# Patient Record
Sex: MALE | Race: WHITE | NOT HISPANIC OR LATINO | Employment: UNEMPLOYED | URBAN - METROPOLITAN AREA
[De-identification: names, ages, dates, MRNs, and addresses within clinical notes are randomized per-mention and may not be internally consistent; named-entity substitution may affect disease eponyms.]

---

## 2017-08-10 RX ORDER — FLUTICASONE PROPIONATE 50 MCG
2 SPRAY, SUSPENSION (ML) NASAL
COMMUNITY
End: 2021-07-22 | Stop reason: SDUPTHER

## 2017-08-10 RX ORDER — OXYCODONE AND ACETAMINOPHEN 10; 325 MG/1; MG/1
1 TABLET ORAL 4 TIMES DAILY
COMMUNITY

## 2017-08-17 ENCOUNTER — OFFICE VISIT (OUTPATIENT)
Dept: NEUROSURGERY | Facility: CLINIC | Age: 57
End: 2017-08-17

## 2017-08-17 VITALS
HEART RATE: 70 BPM | WEIGHT: 240 LBS | DIASTOLIC BLOOD PRESSURE: 81 MMHG | SYSTOLIC BLOOD PRESSURE: 130 MMHG | BODY MASS INDEX: 33.6 KG/M2 | HEIGHT: 71 IN

## 2017-08-17 DIAGNOSIS — M51.17 INTERVERTEBRAL DISC DISORDER WITH RADICULOPATHY OF LUMBOSACRAL REGION: ICD-10-CM

## 2017-08-17 DIAGNOSIS — M54.14 THORACIC RADICULOPATHY: ICD-10-CM

## 2017-08-17 DIAGNOSIS — M54.6 PAIN IN THORACIC SPINE: Primary | ICD-10-CM

## 2017-08-17 DIAGNOSIS — R26.2 DIFFICULTY WALKING: ICD-10-CM

## 2017-08-17 DIAGNOSIS — Z98.1 HX OF FUSION OF CERVICAL SPINE: ICD-10-CM

## 2017-08-17 DIAGNOSIS — M54.2 CERVICAL PAIN: ICD-10-CM

## 2017-08-17 PROCEDURE — 99205 OFFICE O/P NEW HI 60 MIN: CPT | Performed by: NURSE PRACTITIONER

## 2017-08-17 NOTE — PROGRESS NOTES
Subjective   Patient ID: Anthony Mcknight is a 56 y.o. male is being seen for consultation today at the request of Eddie Carpenter MD for back pain that radiates into bilateral buttock. He states that he has a tired feeling in his legs. There is constant numbness in third toe on right foot and intermittent numbness in the heel of the left foot. He is currently taking Percocet  mg PRN for pain.    Back Pain   This is a chronic problem. The current episode started more than 1 year ago. The problem occurs constantly. The problem has been gradually worsening since onset. The pain is present in the lumbar spine, gluteal and thoracic spine. The quality of the pain is described as stabbing, shooting, cramping, burning and aching. The pain radiates to the right thigh and left thigh. The pain is at a severity of 7/10. The pain is moderate. Worse during: depends on daily activity. The symptoms are aggravated by bending, standing, sitting, lying down and twisting (walking). Associated symptoms include abdominal pain (Radiating from the lower thoracic region.), leg pain, numbness, tingling and weakness (bilateral legs). Pertinent negatives include no bladder incontinence or bowel incontinence. (Worsening heaviness in both legs. ) Treatments tried: GORAN, oral narcotics. The treatment provided mild relief.   Neck Pain    This is a chronic problem. The current episode started more than 1 year ago. The problem occurs constantly. The problem has been gradually worsening. Associated with: Has had previous anterior and posterior cervical spine fusion surgeries.  The pain is present in the midline, right side, left side and occipital region. The quality of the pain is described as aching. The pain is at a severity of 7/10. The pain is moderate. The symptoms are aggravated by position, twisting and bending. The pain is same all the time. Associated symptoms include leg pain, numbness, tingling and weakness (bilateral legs). He has  tried oral narcotics and muscle relaxants for the symptoms. The treatment provided no relief.       The following portions of the patient's history were reviewed and updated as appropriate: allergies, current medications, past family history, past medical history, past social history, past surgical history and problem list.    Review of Systems   Constitutional: Positive for activity change.   HENT: Positive for congestion.    Gastrointestinal: Positive for abdominal pain (Radiating from the lower thoracic region.). Negative for bowel incontinence.   Genitourinary: Negative for bladder incontinence.   Musculoskeletal: Positive for arthralgias, back pain, gait problem, joint swelling, myalgias, neck pain and neck stiffness.   Allergic/Immunologic: Positive for environmental allergies.   Neurological: Positive for tingling, weakness (bilateral legs) and numbness.   All other systems reviewed and are negative.      Objective   Physical Exam   Constitutional: He is oriented to person, place, and time. He appears well-developed and well-nourished. He is cooperative. No distress.   HENT:   Head: Normocephalic and atraumatic.   Eyes: Conjunctivae and EOM are normal. Pupils are equal, round, and reactive to light.   Neck: Neck supple.   Cardiovascular: Normal rate and regular rhythm.    Pulmonary/Chest: Effort normal. No respiratory distress.   Abdominal: Soft. He exhibits no distension. There is no tenderness.   Musculoskeletal: Normal range of motion. He exhibits tenderness (in the lower cervical, lower thoracic, and lower lumbar regions with palpation.). He exhibits no edema.   Neurological: He is alert and oriented to person, place, and time. He has normal strength. He displays normal reflexes. No cranial nerve deficit or sensory deficit. He exhibits normal muscle tone. Gait normal. Coordination (Unable to heel walk bilaterally. ) abnormal. Gait normal. GCS eye subscore is 4. GCS verbal subscore is 5. GCS motor subscore  is 6.   Reflex Scores:       Tricep reflexes are 2+ on the right side and 2+ on the left side.       Bicep reflexes are 2+ on the right side and 2+ on the left side.       Brachioradialis reflexes are 2+ on the right side and 2+ on the left side.       Patellar reflexes are 2+ on the right side and 2+ on the left side.       Achilles reflexes are 2+ on the right side and 2+ on the left side.  Positive SLR on the right at 30 degrees. Negative Dong's; negative clonus   Skin: Skin is warm and dry. No rash noted. He is not diaphoretic.   Psychiatric: He has a normal mood and affect. His speech is normal. Thought content normal.   Vitals reviewed.    Neurologic Exam     Mental Status   Oriented to person, place, and time.   Speech: speech is normal   Level of consciousness: alert    Cranial Nerves     CN III, IV, VI   Pupils are equal, round, and reactive to light.  Extraocular motions are normal.     Motor Exam   Muscle bulk: normal  Overall muscle tone: normal    Strength   Strength 5/5 throughout.     Sensory Exam   Light touch normal.     Gait, Coordination, and Reflexes     Gait  Gait: normal    Reflexes   Right brachioradialis: 2+  Left brachioradialis: 2+  Right biceps: 2+  Left biceps: 2+  Right triceps: 2+  Left triceps: 2+  Right patellar: 2+  Left patellar: 2+  Right achilles: 2+  Left achilles: 2+  Right Dong: absent  Left Dong: absent  Right ankle clonus: absent  Left ankle clonus: absent      Assessment/Plan   Independent Review of Radiographic Studies:      I reviewed the MRI images of the lumbar spine performed with and without contrast dated March 18, 2017 today in the office.  The MRI showed multilevel degenerative changes and facet arthropathy.  There is a broad-based disc protrusion at L4-5, L5-S1 and some effacement of bilateral L5 nerve roots.    Medical Decision Making:      Mr. Mcknight was seen today for neurosurgical opinion at the request of Dr. Eddie Carpenter.  Notes from today's visit  will be forwarded upon completion.  Mr. Mcknight has a long history of neck, thoracic, and lumbar pain.  He has a history of prior anterior cervical fusion followed by subsequent posterior cervical fusion in 2008 and 2009.  He is fused posteriorly to the level of T1.  These surgeries were performed while living in [place].  He has been working with Dr. Carpenter for pain management.  He is undergone epidural steroid injections in the lumbar spine with no relief.    Mr. Mcknight complains of worsening neck, bilateral trapezius, worsening thoracic pain with radiation to the lower abdomen and worsening lumbar pain with radiation into both lateral hips and thighs.  He also complains of worsening heaviness in both legs.    Given the history and exam findings, I have ordered a complete spine myelogram as well as complete spine x-rays with flexion-extension views of the cervical and lumbar spine.    .I have discussed the myelogram procedure at length with the patient. The risks of the procedure were explained. There is a risk of infection, bleeding, increased pain and positional headache possibly requiring a blood patch. The best way to avoid the positional headache is by taking it easy and participating in no strenuous activity for a couple of days following the myelogram. Drinking plenty of fluids including caffeinated beverages was encouraged. Should the patient develop a positional headache, I recommended calling the office for further instructions. The patient verbalized understanding of these risks and asked to proceed.     Mr. Mcknight will return to the office after the myelogram for a surgical discussion with Dr. Horne.     Anthony was seen today for back pain.    Diagnoses and all orders for this visit:    Pain in thoracic spine  -     IR myelogram 2 or more areas; Future  -     CT Cervical Spine Without Contrast; Future  -     CT Thoracic Spine Without Contrast; Future  -     XR Spine Lumbar Complete With Flex &  Ext; Future  -     XR Spine Thoracic 2 View; Future  -     Cancel: CT Cervical Spine Without Contrast; Future  -     XR spine cervical complete w flex ext; Future  -     CT Lumbar Spine Without Contrast; Future    Difficulty walking  -     IR myelogram 2 or more areas; Future  -     CT Cervical Spine Without Contrast; Future  -     CT Thoracic Spine Without Contrast; Future  -     XR Spine Lumbar Complete With Flex & Ext; Future  -     XR Spine Thoracic 2 View; Future  -     Cancel: CT Cervical Spine Without Contrast; Future  -     XR spine cervical complete w flex ext; Future  -     CT Lumbar Spine Without Contrast; Future    Intervertebral disc disorder with radiculopathy of lumbosacral region  -     IR myelogram 2 or more areas; Future  -     CT Cervical Spine Without Contrast; Future  -     CT Thoracic Spine Without Contrast; Future  -     XR Spine Lumbar Complete With Flex & Ext; Future  -     XR Spine Thoracic 2 View; Future  -     Cancel: CT Cervical Spine Without Contrast; Future  -     XR spine cervical complete w flex ext; Future  -     CT Lumbar Spine Without Contrast; Future    Thoracic radiculopathy  -     IR myelogram 2 or more areas; Future  -     CT Cervical Spine Without Contrast; Future  -     CT Thoracic Spine Without Contrast; Future  -     XR Spine Lumbar Complete With Flex & Ext; Future  -     XR Spine Thoracic 2 View; Future  -     Cancel: CT Cervical Spine Without Contrast; Future  -     XR spine cervical complete w flex ext; Future  -     CT Lumbar Spine Without Contrast; Future    Cervical pain  -     Cancel: CT Cervical Spine Without Contrast; Future  -     XR spine cervical complete w flex ext; Future  -     CT Lumbar Spine Without Contrast; Future    Hx of fusion of cervical spine  -     Cancel: CT Cervical Spine Without Contrast; Future  -     XR spine cervical complete w flex ext; Future  -     CT Lumbar Spine Without Contrast; Future      Return for after radiographic imaging.            Body mass index is 33.47 kg/(m^2).

## 2017-09-20 RX ORDER — ASPIRIN 81 MG/1
81 TABLET ORAL NIGHTLY
COMMUNITY

## 2017-09-27 ENCOUNTER — HOSPITAL ENCOUNTER (OUTPATIENT)
Dept: GENERAL RADIOLOGY | Facility: HOSPITAL | Age: 57
Discharge: HOME | End: 2017-09-27

## 2017-09-27 ENCOUNTER — HOSPITAL ENCOUNTER (OUTPATIENT)
Dept: CT IMAGING | Facility: HOSPITAL | Age: 57
Discharge: HOME | End: 2017-09-27
Admitting: NURSE PRACTITIONER

## 2017-09-27 VITALS
TEMPERATURE: 97.7 F | WEIGHT: 240 LBS | SYSTOLIC BLOOD PRESSURE: 140 MMHG | DIASTOLIC BLOOD PRESSURE: 81 MMHG | HEIGHT: 71 IN | HEART RATE: 77 BPM | BODY MASS INDEX: 33.6 KG/M2 | RESPIRATION RATE: 18 BRPM | OXYGEN SATURATION: 98 %

## 2017-09-27 DIAGNOSIS — M54.6 PAIN IN THORACIC SPINE: ICD-10-CM

## 2017-09-27 DIAGNOSIS — M54.14 THORACIC RADICULOPATHY: ICD-10-CM

## 2017-09-27 DIAGNOSIS — Z98.1 HX OF FUSION OF CERVICAL SPINE: ICD-10-CM

## 2017-09-27 DIAGNOSIS — R26.2 DIFFICULTY WALKING: ICD-10-CM

## 2017-09-27 DIAGNOSIS — M51.17 INTERVERTEBRAL DISC DISORDER WITH RADICULOPATHY OF LUMBOSACRAL REGION: ICD-10-CM

## 2017-09-27 DIAGNOSIS — M54.2 CERVICAL PAIN: ICD-10-CM

## 2017-09-27 PROCEDURE — 72114 X-RAY EXAM L-S SPINE BENDING: CPT

## 2017-09-27 PROCEDURE — 0 IOPAMIDOL 61 % SOLUTION: Performed by: RADIOLOGY

## 2017-09-27 PROCEDURE — 72131 CT LUMBAR SPINE W/O DYE: CPT

## 2017-09-27 PROCEDURE — 72240 MYELOGRAPHY NECK SPINE: CPT

## 2017-09-27 PROCEDURE — 72052 X-RAY EXAM NECK SPINE 6/>VWS: CPT

## 2017-09-27 PROCEDURE — 72125 CT NECK SPINE W/O DYE: CPT

## 2017-09-27 PROCEDURE — 72128 CT CHEST SPINE W/O DYE: CPT

## 2017-09-27 PROCEDURE — 72072 X-RAY EXAM THORAC SPINE 3VWS: CPT

## 2017-09-27 RX ORDER — HYDROXYCHLOROQUINE SULFATE 200 MG/1
200 TABLET, FILM COATED ORAL 2 TIMES DAILY
COMMUNITY

## 2017-09-27 RX ORDER — LIDOCAINE HYDROCHLORIDE 10 MG/ML
10 INJECTION, SOLUTION INFILTRATION; PERINEURAL ONCE
Status: COMPLETED | OUTPATIENT
Start: 2017-09-27 | End: 2017-09-27

## 2017-09-27 RX ORDER — ALPRAZOLAM 0.5 MG/1
0.5 TABLET ORAL ONCE
Status: COMPLETED | OUTPATIENT
Start: 2017-09-27 | End: 2017-09-27

## 2017-09-27 RX ORDER — HYDROCODONE BITARTRATE AND ACETAMINOPHEN 7.5; 325 MG/1; MG/1
1 TABLET ORAL ONCE
Status: COMPLETED | OUTPATIENT
Start: 2017-09-27 | End: 2017-09-27

## 2017-09-27 RX ADMIN — HYDROCODONE BITARTRATE AND ACETAMINOPHEN 1 TABLET: 7.5; 325 TABLET ORAL at 10:36

## 2017-09-27 RX ADMIN — ALPRAZOLAM 0.5 MG: 0.5 TABLET ORAL at 08:30

## 2017-09-27 RX ADMIN — IOPAMIDOL 13 ML: 612 INJECTION, SOLUTION INTRATHECAL at 10:02

## 2017-09-27 RX ADMIN — LIDOCAINE HYDROCHLORIDE 8 ML: 10 INJECTION, SOLUTION INFILTRATION; PERINEURAL at 10:02

## 2017-09-27 NOTE — DISCHARGE INSTRUCTIONS
EDUCATION /DISCHARGE INSTRUCTIONS:  A myelogram is a special radiology procedure of the spinal cord, spinal nerves and other related structures.  You will be awake during the examination.  An area of your lower back will be cleansed with an antiseptic solution.  The physician will inject a numbing medication in your lower back.  While your back is numb, a needle will be placed in the lower back area.  A small amount of spinal fluid may be withdrawn and sent to the lab if ordered by your physician. While the needle is in the back, an injection of a contrast material (xray dye) will be given through the needle.  The contrast material will allow the physician to see the spinal cord and spinal nerves.  Once injected, the needle will be removed and a band aid will be placed over the injection site.  The table will be tilted during the process to allow the contrast material to flow to particular areas in the spine.  Following the injection and xrays, you will be taken to the CT scan where more pictures will be taken. After the procedure is finished, the contrast material will be absorbed by your body and eliminated through your kidneys.  The radiologist will study and interpret your myelogram and send the results to your physician.    Procedure risks of a myelogram include, but are not limited to:  *  Bleeding   *  seizure  *  Infection   *  Headache, possibly severe requiring  *  Contrast reaction      a blood patch  *  Nerve or cord injury  *  Paralysis and death    Benefits of the procedure:  *  Best examination for delineating pathology related to spinal cord compression from a disc and/or nerve root compression    Alternatives to the procedure:  MRI - a non invasive procedure requiring intravenous contrast injection.  Cannot be done on patients with certain pacemakers or metal in the body.  MRI risks include possible reaction to the contrast material, movement of metal located in the body.  Benefit to MRI:   Non-invasive and usually painless procedure.  THIS EDUCATION INFORMATION WAS REVIEWED PRIOR TO THE PROCEDURE AND CONSENT. Patient initials __________________Time_________________    Important information following your myelogram:  *  Lie down with your head elevated no more than 2 pillows high today & tonight  *  Tomorrow, lie down with 1 pillow all day and all night  *  Sit up to eat meals and use the bathroom, otherwise, lie down  *  Do not drive for 48 hours following a myelogram  *  You may remove the bandage and shower in the morning  *  Increase your fluids for the next 24 hours.  Caffeinated drinks are encouraged.     Resume taking your blood thinner or Aspirin on _________Thursday, Sept 28th after 9 am____________________    Resume taking your diabetic oral medication on_________N/A____________________    Resume taking antipsychotics/antidepressant on __________N/A__________________    If you have problems with a headache that is not relieved with rest and medication, please call the Radiology Triage Nurse desk  [phone]

## 2017-09-27 NOTE — H&P
Name: Anthony Mcknight ADMIT: 2017   : 1960  PCP: Grant Hale MD    MRN: [id number] LOS: 0 days   AGE/SEX: 56 y.o. male  ROOM: Room/bed info not found       Chief complaint LBP and T pain for CT L and T myelogram.    Present Illness or Internal History:  Patient is a 56 y.o. male presents with  LBP and T pain for CT L and T myelogram..     Past Surgical History:  Past Surgical History:   Procedure Laterality Date   • BONE SPUR LEG     • CERVICAL SPINE SURGERY     • GANGLION CYST EXCISION Right     wrist   • HEMANGIOMA EXCISION Right    • KNEE ARTHROPLASTY Bilateral    • SHOULDER SURGERY     • THYROID SURGERY         Past Medical History:  Past Medical History:   Diagnosis Date   • Arthritis    • Asthma    • Complicated migraine    • Disease of thyroid gland    • GERD (gastroesophageal reflux disease)    • Seasonal allergies    • Seizures    • Sjogren's disease    • TIA (transient ischemic attack)        Home Medications:    (Not in a hospital admission)    Allergies:  Latex    Family History:  Family History   Problem Relation Age of Onset   • Multiple sclerosis Mother    • COPD Father    • Lung disease Father    • No Known Problems Sister        Social History:  Social History   Substance Use Topics   • Smoking status: Never Smoker   • Smokeless tobacco: None   • Alcohol use No        Objective     Physical Exam:    Head normocephalic, without obvious abnormality and atraumatic  Lungs clear to auscultation, respirations regular, respirations even and respirations unlabored  Heart regular rhythm & normal rate  Abdomen normal bowel sounds, soft non-tender, no guarding and no rebound tenderness    Vital Signs  Temp:  [97.7 °F (36.5 °C)] 97.7 °F (36.5 °C)  Heart Rate:  [85] 85  Resp:  [18] 18  BP: (147)/(85) 147/85    Anticipated Surgical Procedure:   LBP and T pain for CT L and T myelogram.    The risks, benefits and alternatives of this procedure have been discussed with the  patient or responsible party: Yes        Cheko Allison MD  09/27/17  8:43 AM

## 2017-09-27 NOTE — NURSING NOTE
Reviewed discharge instructions with patient. No questions at this time. Patient wheeled out via wheelchair with RN to car where ride awaited.

## 2017-09-28 ENCOUNTER — TELEPHONE (OUTPATIENT)
Dept: INTERVENTIONAL RADIOLOGY/VASCULAR | Facility: HOSPITAL | Age: 57
End: 2017-09-28

## 2017-10-04 ENCOUNTER — OFFICE VISIT (OUTPATIENT)
Dept: NEUROSURGERY | Facility: CLINIC | Age: 57
End: 2017-10-04

## 2017-10-04 VITALS
BODY MASS INDEX: 33.6 KG/M2 | WEIGHT: 240 LBS | HEIGHT: 71 IN | HEART RATE: 84 BPM | DIASTOLIC BLOOD PRESSURE: 90 MMHG | SYSTOLIC BLOOD PRESSURE: 130 MMHG

## 2017-10-04 DIAGNOSIS — M51.36 DDD (DEGENERATIVE DISC DISEASE), LUMBAR: ICD-10-CM

## 2017-10-04 DIAGNOSIS — M51.34 DEGENERATION OF THORACIC INTERVERTEBRAL DISC: ICD-10-CM

## 2017-10-04 DIAGNOSIS — Z98.1 HX OF FUSION OF CERVICAL SPINE: Primary | ICD-10-CM

## 2017-10-04 PROCEDURE — 99213 OFFICE O/P EST LOW 20 MIN: CPT | Performed by: NEUROLOGICAL SURGERY

## 2017-10-04 RX ORDER — TESTOSTERONE CYPIONATE 200 MG/ML
INJECTION, SOLUTION INTRAMUSCULAR
Refills: 0 | COMMUNITY
Start: 2017-08-26 | End: 2021-07-21

## 2017-10-04 NOTE — PROGRESS NOTES
Subjective   Patient ID: Anthony Mcknight is a 56 y.o. male is here today for follow-up on neck and back pain.    At last visit the patient complained of bilateral buttock pain, tired feelings in his legs and constant numbness in the heel of the left foot.    Today the patient is here with a new cervical, thoracic, and lumbar spine myelogram. He denies any complications from the myelogram. Patient states that his pain has gotten worse since last visit. He now has a burning feeling down the back of the legs.    Back Pain   This is a chronic problem. The current episode started more than 1 year ago. The problem occurs constantly. The problem has been gradually worsening since onset. Associated symptoms include leg pain, numbness and weakness.   Neck Pain    This is a chronic problem. The current episode started more than 1 year ago. The problem occurs constantly. The problem has been gradually worsening. Associated symptoms include leg pain, numbness and weakness.       The following portions of the patient's history were reviewed and updated as appropriate: allergies, current medications, past family history, past medical history, past social history, past surgical history and problem list.    Review of Systems   Musculoskeletal: Positive for back pain and neck pain.   Neurological: Positive for weakness and numbness.   All other systems reviewed and are negative.    The patient was sent here by his pain physician to investigate whether or not there are any surgical options.  He has had 4 previous neck surgeries and he is fused from C5-T3.  One of the surgeries was from the front, 3 from the back. That was done in [place].  He has had chronic neck pain ever since and more recently he has developed thoracolumbar pain with some pain down his legs.  I have reviewed the cervical, thoracic and lumbar myelogram.  While there are areas of disk disease and facet arthropathy,  I do not see any hardware issues.  I see no  instability.   I see no root or cord compression.  I see no reason to consider surgery as a means to treat this unfortunate gentleman. He has been treated by Dr. Carpenter for about a year since he relocated to this area. He is in [employer] school. I told him to continue working with Dr. Carpenter. They have discussed the possibility of a spinal cord stimulator.  I also asked him to discuss with Dr. Carpenter whether or not a radiofrequency ablation at various places throughout his spine could be helpful.  I think it is worth entering into that dialog because another fusion surgery, would not be terribly appropriate or terribly helpful in this patient.      Objective   Physical Exam   Constitutional: He is oriented to person, place, and time. He appears well-developed and well-nourished.   HENT:   Head: Normocephalic and atraumatic.   Eyes: Conjunctivae and EOM are normal. Pupils are equal, round, and reactive to light.   Fundoscopic exam:       The right eye shows no papilledema. The right eye shows venous pulsations.        The left eye shows no papilledema. The left eye shows venous pulsations.   Neck: Carotid bruit is not present.   Neurological: He is oriented to person, place, and time. He has a normal Finger-Nose-Finger Test and a normal Heel to Shin Test. Gait normal.   Reflex Scores:       Tricep reflexes are 2+ on the right side and 2+ on the left side.       Bicep reflexes are 2+ on the right side and 2+ on the left side.       Brachioradialis reflexes are 2+ on the right side and 2+ on the left side.       Patellar reflexes are 2+ on the right side and 2+ on the left side.       Achilles reflexes are 2+ on the right side and 2+ on the left side.  Psychiatric: His speech is normal.     Neurologic Exam     Mental Status   Oriented to person, place, and time.   Registration of memory: Good recent and remote memory.   Attention: normal. Concentration: normal.   Speech: speech is normal   Level of consciousness:  alert  Knowledge: consistent with education.     Cranial Nerves     CN II   Visual fields full to confrontation.   Visual acuity: normal    CN III, IV, VI   Pupils are equal, round, and reactive to light.  Extraocular motions are normal.     CN V   Facial sensation intact.   Right corneal reflex: normal  Left corneal reflex: normal    CN VII   Facial expression full, symmetric.   Right facial weakness: none  Left facial weakness: none    CN VIII   Hearing: intact    CN IX, X   Palate: symmetric    CN XI   Right sternocleidomastoid strength: normal  Left sternocleidomastoid strength: normal    CN XII   Tongue: not atrophic  Tongue deviation: none    Motor Exam   Muscle bulk: normal  Right arm tone: normal  Left arm tone: normal  Right leg tone: normal  Left leg tone: normal    Strength   Strength 5/5 except as noted.     Sensory Exam   Light touch normal.     Gait, Coordination, and Reflexes     Gait  Gait: normal    Coordination   Finger to nose coordination: normal  Heel to shin coordination: normal    Reflexes   Right brachioradialis: 2+  Left brachioradialis: 2+  Right biceps: 2+  Left biceps: 2+  Right triceps: 2+  Left triceps: 2+  Right patellar: 2+  Left patellar: 2+  Right achilles: 2+  Left achilles: 2+  Right : 2+  Left : 2+      Assessment/Plan   Independent Review of Radiographic Studies:    I have reviewed the cervical, thoracic and lumbar myelogram done on 09/27/2017.  He is fused from C5-C6 to C6-C7 and from C5 down to T3.  I do not see any obvious problems with the hardware. He does have multilevel degenerative disk disease and facet disease but I simply do not see any significant cord compression, root compression or any instability.  I agree with the report.       Medical Decision Making:    Unfortunately, I do not have much to really offer this particular patient.  He does have significant degenerative disk disease and facet disease but surgery is not going to be helpful.  I urged him to  follow-up with Dr. Carpenter.  I know they are thinking about a spinal cord stimulator but an intermediate step might be to consider some kind of radiofrequency ablation.  That could potentially be helpful and avoid an implant. We will keep it open-ended.      Anthony was seen today for back pain and neck pain.    Diagnoses and all orders for this visit:    Hx of fusion of cervical spine    DDD (degenerative disc disease), lumbar    Degeneration of thoracic intervertebral disc    Return if symptoms worsen or fail to improve.

## 2018-09-07 ENCOUNTER — CONVERSION ENCOUNTER (OUTPATIENT)
Dept: FAMILY MEDICINE CLINIC | Facility: CLINIC | Age: 58
End: 2018-09-07

## 2018-09-07 ENCOUNTER — OFFICE VISIT CONVERTED (OUTPATIENT)
Dept: FAMILY MEDICINE CLINIC | Facility: CLINIC | Age: 58
End: 2018-09-07
Attending: NURSE PRACTITIONER

## 2018-09-14 ENCOUNTER — OFFICE VISIT CONVERTED (OUTPATIENT)
Dept: FAMILY MEDICINE CLINIC | Facility: CLINIC | Age: 58
End: 2018-09-14
Attending: NURSE PRACTITIONER

## 2018-12-11 ENCOUNTER — OFFICE VISIT CONVERTED (OUTPATIENT)
Dept: UROLOGY | Facility: CLINIC | Age: 58
End: 2018-12-11
Attending: NURSE PRACTITIONER

## 2019-01-25 ENCOUNTER — OFFICE VISIT CONVERTED (OUTPATIENT)
Dept: FAMILY MEDICINE CLINIC | Facility: CLINIC | Age: 59
End: 2019-01-25
Attending: NURSE PRACTITIONER

## 2019-01-25 ENCOUNTER — HOSPITAL ENCOUNTER (OUTPATIENT)
Dept: FAMILY MEDICINE CLINIC | Facility: CLINIC | Age: 59
Discharge: HOME | End: 2019-01-25
Attending: NURSE PRACTITIONER

## 2019-01-25 LAB
25(OH)D3 SERPL-MCNC: 16.2 NG/ML (ref 30–100)
ALBUMIN SERPL-MCNC: 4.3 G/DL (ref 3.5–5)
ALBUMIN/GLOB SERPL: 1.6 {RATIO} (ref 1.4–2.6)
ALP SERPL-CCNC: 75 U/L (ref 56–119)
ALT SERPL-CCNC: 19 U/L (ref 10–40)
ANION GAP SERPL CALC-SCNC: 18 MMOL/L (ref 8–19)
AST SERPL-CCNC: 19 U/L (ref 15–50)
BASOPHILS # BLD AUTO: 0.03 10*3/UL (ref 0–0.2)
BASOPHILS NFR BLD AUTO: 0.57 % (ref 0–3)
BILIRUB SERPL-MCNC: 0.23 MG/DL (ref 0.2–1.3)
BUN SERPL-MCNC: 12 MG/DL (ref 5–25)
BUN/CREAT SERPL: 15 {RATIO} (ref 6–20)
CALCIUM SERPL-MCNC: 9.6 MG/DL (ref 8.7–10.4)
CHLORIDE SERPL-SCNC: 103 MMOL/L (ref 99–111)
CHOLEST SERPL-MCNC: 167 MG/DL (ref 107–200)
CHOLEST/HDLC SERPL: 3.5 {RATIO} (ref 3–6)
CONV CO2: 26 MMOL/L (ref 22–32)
CONV TOTAL PROTEIN: 7 G/DL (ref 6.3–8.2)
CREAT UR-MCNC: 0.82 MG/DL (ref 0.7–1.2)
EOSINOPHIL # BLD AUTO: 0.11 10*3/UL (ref 0–0.7)
EOSINOPHIL # BLD AUTO: 2.43 % (ref 0–7)
ERYTHROCYTE [DISTWIDTH] IN BLOOD BY AUTOMATED COUNT: 11.8 % (ref 11.5–14.5)
GFR SERPLBLD BASED ON 1.73 SQ M-ARVRAT: >60 ML/MIN/{1.73_M2}
GLOBULIN UR ELPH-MCNC: 2.7 G/DL (ref 2–3.5)
GLUCOSE SERPL-MCNC: 80 MG/DL (ref 70–99)
HBA1C MFR BLD: 14.2 G/DL (ref 14–18)
HCT VFR BLD AUTO: 40.9 % (ref 42–52)
HDLC SERPL-MCNC: 48 MG/DL (ref 40–60)
LDLC SERPL CALC-MCNC: 92 MG/DL (ref 70–100)
LYMPHOCYTES # BLD AUTO: 1.61 10*3/UL (ref 1–5)
MCH RBC QN AUTO: 31.7 PG (ref 27–31)
MCHC RBC AUTO-ENTMCNC: 34.6 G/DL (ref 33–37)
MCV RBC AUTO: 91.7 FL (ref 80–96)
MONOCYTES # BLD AUTO: 0.44 10*3/UL (ref 0.2–1.2)
MONOCYTES NFR BLD AUTO: 9.82 % (ref 3–10)
NEUTROPHILS # BLD AUTO: 2.28 10*3/UL (ref 2–8)
NEUTROPHILS NFR BLD AUTO: 51.1 % (ref 30–85)
NRBC BLD AUTO-RTO: 0 % (ref 0–0.01)
OSMOLALITY SERPL CALC.SUM OF ELEC: 293 MOSM/KG (ref 273–304)
PLATELET # BLD AUTO: 233 10*3/UL (ref 130–400)
PMV BLD AUTO: 8.7 FL (ref 7.4–10.4)
POTASSIUM SERPL-SCNC: 4.5 MMOL/L (ref 3.5–5.3)
RBC # BLD AUTO: 4.46 10*6/UL (ref 4.7–6.1)
SODIUM SERPL-SCNC: 142 MMOL/L (ref 135–147)
T4 FREE SERPL-MCNC: 1.9 NG/DL (ref 0.9–1.8)
TESTOST SERPL-MCNC: 200 NG/DL (ref 193–740)
TRIGL SERPL-MCNC: 133 MG/DL (ref 40–150)
TSH SERPL-ACNC: 1.56 M[IU]/L (ref 0.27–4.2)
VARIANT LYMPHS NFR BLD MANUAL: 36.1 % (ref 20–45)
VLDLC SERPL-MCNC: 27 MG/DL (ref 5–37)
WBC # BLD AUTO: 4.47 10*3/UL (ref 4.8–10.8)

## 2019-01-27 LAB — TESTOSTERONE, FREE: 3.7 PG/ML (ref 6.6–18.1)

## 2019-03-18 ENCOUNTER — OFFICE VISIT CONVERTED (OUTPATIENT)
Dept: SURGERY | Facility: CLINIC | Age: 59
End: 2019-03-18
Attending: NURSE PRACTITIONER

## 2019-04-11 ENCOUNTER — HOSPITAL ENCOUNTER (OUTPATIENT)
Dept: GASTROENTEROLOGY | Facility: HOSPITAL | Age: 59
Setting detail: HOSPITAL OUTPATIENT SURGERY
Discharge: HOME | End: 2019-04-11
Attending: SURGERY

## 2019-05-06 ENCOUNTER — HOSPITAL ENCOUNTER (OUTPATIENT)
Dept: FAMILY MEDICINE CLINIC | Facility: CLINIC | Age: 59
Discharge: HOME | End: 2019-05-06
Attending: NURSE PRACTITIONER

## 2019-05-06 ENCOUNTER — OFFICE VISIT CONVERTED (OUTPATIENT)
Dept: FAMILY MEDICINE CLINIC | Facility: CLINIC | Age: 59
End: 2019-05-06
Attending: NURSE PRACTITIONER

## 2019-05-06 LAB
BASOPHILS # BLD AUTO: 0.05 10*3/UL (ref 0–0.2)
BASOPHILS NFR BLD AUTO: 0.4 % (ref 0–3)
CONV ABS IMM GRAN: 0.05 10*3/UL (ref 0–0.2)
CONV IMMATURE GRAN: 0.4 % (ref 0–1.8)
DEPRECATED RDW RBC AUTO: 50.7 FL (ref 35.1–43.9)
EOSINOPHIL # BLD AUTO: 0.15 10*3/UL (ref 0–0.7)
EOSINOPHIL # BLD AUTO: 1.2 % (ref 0–7)
ERYTHROCYTE [DISTWIDTH] IN BLOOD BY AUTOMATED COUNT: 14.3 % (ref 11.6–14.4)
FOLATE SERPL-MCNC: >20 NG/ML (ref 4.8–20)
HBA1C MFR BLD: 15.1 G/DL (ref 14–18)
HCT VFR BLD AUTO: 47.6 % (ref 42–52)
LYMPHOCYTES # BLD AUTO: 1.77 10*3/UL (ref 1–5)
MCH RBC QN AUTO: 30.5 PG (ref 27–31)
MCHC RBC AUTO-ENTMCNC: 31.7 G/DL (ref 33–37)
MCV RBC AUTO: 96.2 FL (ref 80–96)
MONOCYTES # BLD AUTO: 1.14 10*3/UL (ref 0.2–1.2)
MONOCYTES NFR BLD AUTO: 9.1 % (ref 3–10)
NEUTROPHILS # BLD AUTO: 9.41 10*3/UL (ref 2–8)
NEUTROPHILS NFR BLD AUTO: 74.8 % (ref 30–85)
NRBC CBCN: 0 % (ref 0–0.7)
PLATELET # BLD AUTO: 212 10*3/UL (ref 130–400)
PMV BLD AUTO: 12.1 FL (ref 9.4–12.4)
RBC # BLD AUTO: 4.95 10*6/UL (ref 4.7–6.1)
VARIANT LYMPHS NFR BLD MANUAL: 14.1 % (ref 20–45)
VIT B12 SERPL-MCNC: 1173 PG/ML (ref 211–911)
WBC # BLD AUTO: 12.57 10*3/UL (ref 4.8–10.8)

## 2019-05-08 LAB — BACTERIA SPEC AEROBE CULT: NORMAL

## 2019-05-18 ENCOUNTER — HOSPITAL ENCOUNTER (OUTPATIENT)
Dept: URGENT CARE | Facility: CLINIC | Age: 59
Discharge: HOME | End: 2019-05-18

## 2019-05-24 ENCOUNTER — OFFICE VISIT CONVERTED (OUTPATIENT)
Dept: SURGERY | Facility: CLINIC | Age: 59
End: 2019-05-24
Attending: NURSE PRACTITIONER

## 2019-05-24 ENCOUNTER — HOSPITAL ENCOUNTER (OUTPATIENT)
Dept: GENERAL RADIOLOGY | Facility: HOSPITAL | Age: 59
Discharge: HOME | End: 2019-05-24
Attending: NURSE PRACTITIONER

## 2019-07-05 ENCOUNTER — HOSPITAL ENCOUNTER (OUTPATIENT)
Dept: GASTROENTEROLOGY | Facility: HOSPITAL | Age: 59
Setting detail: HOSPITAL OUTPATIENT SURGERY
Discharge: HOME | End: 2019-07-05
Attending: SURGERY

## 2019-07-11 ENCOUNTER — OFFICE VISIT CONVERTED (OUTPATIENT)
Dept: FAMILY MEDICINE CLINIC | Facility: CLINIC | Age: 59
End: 2019-07-11
Attending: FAMILY MEDICINE

## 2019-07-11 ENCOUNTER — CONVERSION ENCOUNTER (OUTPATIENT)
Dept: FAMILY MEDICINE CLINIC | Facility: CLINIC | Age: 59
End: 2019-07-11

## 2019-07-19 ENCOUNTER — OFFICE VISIT CONVERTED (OUTPATIENT)
Dept: SURGERY | Facility: CLINIC | Age: 59
End: 2019-07-19
Attending: SURGERY

## 2019-08-07 ENCOUNTER — HOSPITAL ENCOUNTER (OUTPATIENT)
Dept: GENERAL RADIOLOGY | Facility: HOSPITAL | Age: 59
Discharge: HOME | End: 2019-08-07
Attending: FAMILY MEDICINE

## 2019-08-07 LAB
BASOPHILS # BLD AUTO: 0.02 10*3/UL (ref 0–0.2)
BASOPHILS NFR BLD AUTO: 0.3 % (ref 0–3)
CONV ABS IMM GRAN: 0.02 10*3/UL (ref 0–0.2)
CONV IMMATURE GRAN: 0.3 % (ref 0–1.8)
DEPRECATED RDW RBC AUTO: 56.8 FL (ref 35.1–43.9)
EOSINOPHIL # BLD AUTO: 0.16 10*3/UL (ref 0–0.7)
EOSINOPHIL # BLD AUTO: 2.7 % (ref 0–7)
ERYTHROCYTE [DISTWIDTH] IN BLOOD BY AUTOMATED COUNT: 16.2 % (ref 11.6–14.4)
HCT VFR BLD AUTO: 44.7 % (ref 42–52)
HGB BLD-MCNC: 13.9 G/DL (ref 14–18)
LYMPHOCYTES # BLD AUTO: 1.94 10*3/UL (ref 1–5)
LYMPHOCYTES NFR BLD AUTO: 32.4 % (ref 20–45)
MCH RBC QN AUTO: 29.6 PG (ref 27–31)
MCHC RBC AUTO-ENTMCNC: 31.1 G/DL (ref 33–37)
MCV RBC AUTO: 95.1 FL (ref 80–96)
MONOCYTES # BLD AUTO: 0.55 10*3/UL (ref 0.2–1.2)
MONOCYTES NFR BLD AUTO: 9.2 % (ref 3–10)
NEUTROPHILS # BLD AUTO: 3.3 10*3/UL (ref 2–8)
NEUTROPHILS NFR BLD AUTO: 55.1 % (ref 30–85)
NRBC CBCN: 0 % (ref 0–0.7)
PLATELET # BLD AUTO: 238 10*3/UL (ref 130–400)
PMV BLD AUTO: 11.7 FL (ref 9.4–12.4)
RBC # BLD AUTO: 4.7 10*6/UL (ref 4.7–6.1)
TESTOST SERPL-MCNC: 446 NG/DL (ref 193–740)
WBC # BLD AUTO: 5.99 10*3/UL (ref 4.8–10.8)

## 2019-09-20 ENCOUNTER — HOSPITAL ENCOUNTER (OUTPATIENT)
Dept: GENERAL RADIOLOGY | Facility: HOSPITAL | Age: 59
Discharge: HOME | End: 2019-09-20
Attending: FAMILY MEDICINE

## 2019-09-20 ENCOUNTER — OFFICE VISIT CONVERTED (OUTPATIENT)
Dept: FAMILY MEDICINE CLINIC | Facility: CLINIC | Age: 59
End: 2019-09-20
Attending: FAMILY MEDICINE

## 2019-09-20 ENCOUNTER — CONVERSION ENCOUNTER (OUTPATIENT)
Dept: FAMILY MEDICINE CLINIC | Facility: CLINIC | Age: 59
End: 2019-09-20

## 2019-09-20 LAB
BASOPHILS # BLD AUTO: 0.03 10*3/UL (ref 0–0.2)
BASOPHILS NFR BLD AUTO: 0.5 % (ref 0–3)
CONV ABS IMM GRAN: 0.02 10*3/UL (ref 0–0.2)
CONV IMMATURE GRAN: 0.3 % (ref 0–1.8)
DEPRECATED RDW RBC AUTO: 49.9 FL (ref 35.1–43.9)
EOSINOPHIL # BLD AUTO: 0.16 10*3/UL (ref 0–0.7)
EOSINOPHIL # BLD AUTO: 2.7 % (ref 0–7)
ERYTHROCYTE [DISTWIDTH] IN BLOOD BY AUTOMATED COUNT: 14.4 % (ref 11.6–14.4)
HCT VFR BLD AUTO: 48.6 % (ref 42–52)
HGB BLD-MCNC: 15.7 G/DL (ref 14–18)
LYMPHOCYTES # BLD AUTO: 1.65 10*3/UL (ref 1–5)
LYMPHOCYTES NFR BLD AUTO: 28 % (ref 20–45)
MCH RBC QN AUTO: 30.4 PG (ref 27–31)
MCHC RBC AUTO-ENTMCNC: 32.3 G/DL (ref 33–37)
MCV RBC AUTO: 94.2 FL (ref 80–96)
MONOCYTES # BLD AUTO: 0.66 10*3/UL (ref 0.2–1.2)
MONOCYTES NFR BLD AUTO: 11.2 % (ref 3–10)
NEUTROPHILS # BLD AUTO: 3.37 10*3/UL (ref 2–8)
NEUTROPHILS NFR BLD AUTO: 57.3 % (ref 30–85)
NRBC CBCN: 0 % (ref 0–0.7)
PLATELET # BLD AUTO: 293 10*3/UL (ref 130–400)
PMV BLD AUTO: 11.4 FL (ref 9.4–12.4)
PSA SERPL-MCNC: 0.71 NG/ML (ref 0–4)
RBC # BLD AUTO: 5.16 10*6/UL (ref 4.7–6.1)
T4 FREE SERPL-MCNC: 1.5 NG/DL (ref 0.9–1.8)
TSH SERPL-ACNC: 2.05 M[IU]/L (ref 0.27–4.2)
WBC # BLD AUTO: 5.89 10*3/UL (ref 4.8–10.8)

## 2019-09-30 ENCOUNTER — OFFICE VISIT CONVERTED (OUTPATIENT)
Dept: FAMILY MEDICINE CLINIC | Facility: CLINIC | Age: 59
End: 2019-09-30
Attending: FAMILY MEDICINE

## 2019-09-30 ENCOUNTER — HOSPITAL ENCOUNTER (OUTPATIENT)
Dept: GENERAL RADIOLOGY | Facility: HOSPITAL | Age: 59
Discharge: HOME | End: 2019-09-30
Attending: FAMILY MEDICINE

## 2019-10-01 ENCOUNTER — HOSPITAL ENCOUNTER (OUTPATIENT)
Dept: GENERAL RADIOLOGY | Facility: HOSPITAL | Age: 59
Discharge: HOME | End: 2019-10-01
Attending: FAMILY MEDICINE

## 2019-10-01 LAB
ALBUMIN SERPL-MCNC: 4.3 G/DL (ref 3.5–5)
ANION GAP SERPL CALC-SCNC: 19 MMOL/L (ref 8–19)
BNP SERPL-MCNC: 61 PG/ML (ref 0–900)
BUN SERPL-MCNC: 11 MG/DL (ref 5–25)
BUN/CREAT SERPL: 12 {RATIO} (ref 6–20)
CALCIUM SERPL-MCNC: 9 MG/DL (ref 8.7–10.4)
CHLORIDE SERPL-SCNC: 100 MMOL/L (ref 99–111)
CONV CO2: 24 MMOL/L (ref 22–32)
CREAT UR-MCNC: 0.92 MG/DL (ref 0.7–1.2)
GFR SERPLBLD BASED ON 1.73 SQ M-ARVRAT: >60 ML/MIN/{1.73_M2}
GLUCOSE SERPL-MCNC: 103 MG/DL (ref 70–99)
PHOSPHATE SERPL-MCNC: 2.7 MG/DL (ref 2.4–4.5)
POTASSIUM SERPL-SCNC: 4.3 MMOL/L (ref 3.5–5.3)
SODIUM SERPL-SCNC: 139 MMOL/L (ref 135–147)

## 2019-11-01 ENCOUNTER — OFFICE VISIT CONVERTED (OUTPATIENT)
Dept: FAMILY MEDICINE CLINIC | Facility: CLINIC | Age: 59
End: 2019-11-01
Attending: FAMILY MEDICINE

## 2019-11-14 ENCOUNTER — HOSPITAL ENCOUNTER (OUTPATIENT)
Dept: GENERAL RADIOLOGY | Facility: HOSPITAL | Age: 59
Discharge: HOME | End: 2019-11-14
Attending: FAMILY MEDICINE

## 2019-11-14 ENCOUNTER — CONVERSION ENCOUNTER (OUTPATIENT)
Dept: FAMILY MEDICINE CLINIC | Facility: CLINIC | Age: 59
End: 2019-11-14

## 2019-11-14 ENCOUNTER — OFFICE VISIT CONVERTED (OUTPATIENT)
Dept: FAMILY MEDICINE CLINIC | Facility: CLINIC | Age: 59
End: 2019-11-14
Attending: FAMILY MEDICINE

## 2019-11-14 LAB
CREAT BLD-MCNC: 0.9 MG/DL (ref 0.6–1.4)
GFR SERPLBLD BASED ON 1.73 SQ M-ARVRAT: >60 ML/MIN/{1.73_M2}

## 2019-11-20 ENCOUNTER — HOSPITAL ENCOUNTER (OUTPATIENT)
Dept: GENERAL RADIOLOGY | Facility: HOSPITAL | Age: 59
Discharge: HOME | End: 2019-11-20
Attending: FAMILY MEDICINE

## 2019-12-13 ENCOUNTER — OFFICE VISIT CONVERTED (OUTPATIENT)
Dept: OTOLARYNGOLOGY | Facility: CLINIC | Age: 59
End: 2019-12-13
Attending: OTOLARYNGOLOGY

## 2019-12-20 ENCOUNTER — OFFICE VISIT CONVERTED (OUTPATIENT)
Dept: FAMILY MEDICINE CLINIC | Facility: CLINIC | Age: 59
End: 2019-12-20
Attending: FAMILY MEDICINE

## 2019-12-20 ENCOUNTER — CONVERSION ENCOUNTER (OUTPATIENT)
Dept: FAMILY MEDICINE CLINIC | Facility: CLINIC | Age: 59
End: 2019-12-20

## 2020-01-16 ENCOUNTER — HOSPITAL ENCOUNTER (OUTPATIENT)
Dept: GENERAL RADIOLOGY | Facility: HOSPITAL | Age: 60
Discharge: HOME | End: 2020-01-16
Attending: FAMILY MEDICINE

## 2020-01-16 LAB — TESTOST SERPL-MCNC: 452 NG/DL (ref 193–740)

## 2020-03-10 ENCOUNTER — OFFICE VISIT CONVERTED (OUTPATIENT)
Dept: FAMILY MEDICINE CLINIC | Facility: CLINIC | Age: 60
End: 2020-03-10
Attending: FAMILY MEDICINE

## 2020-03-13 ENCOUNTER — OFFICE VISIT CONVERTED (OUTPATIENT)
Dept: OTOLARYNGOLOGY | Facility: CLINIC | Age: 60
End: 2020-03-13
Attending: OTOLARYNGOLOGY

## 2020-03-27 ENCOUNTER — TELEMEDICINE CONVERTED (OUTPATIENT)
Dept: FAMILY MEDICINE CLINIC | Facility: CLINIC | Age: 60
End: 2020-03-27
Attending: FAMILY MEDICINE

## 2020-03-30 ENCOUNTER — HOSPITAL ENCOUNTER (OUTPATIENT)
Dept: GENERAL RADIOLOGY | Facility: HOSPITAL | Age: 60
Discharge: HOME | End: 2020-03-30
Attending: FAMILY MEDICINE

## 2020-03-30 LAB
HCT VFR BLD AUTO: 49.3 % (ref 42–52)
HGB BLD-MCNC: 15.4 G/DL (ref 14–18)
T4 FREE SERPL-MCNC: 1.8 NG/DL (ref 0.9–1.8)
TSH SERPL-ACNC: 0.65 M[IU]/L (ref 0.27–4.2)

## 2020-05-11 ENCOUNTER — OFFICE VISIT CONVERTED (OUTPATIENT)
Dept: FAMILY MEDICINE CLINIC | Facility: CLINIC | Age: 60
End: 2020-05-11
Attending: FAMILY MEDICINE

## 2020-07-22 ENCOUNTER — HOSPITAL ENCOUNTER (OUTPATIENT)
Dept: GENERAL RADIOLOGY | Facility: HOSPITAL | Age: 60
Discharge: HOME | End: 2020-07-22
Attending: FAMILY MEDICINE

## 2020-07-22 ENCOUNTER — OFFICE VISIT CONVERTED (OUTPATIENT)
Dept: FAMILY MEDICINE CLINIC | Facility: CLINIC | Age: 60
End: 2020-07-22
Attending: FAMILY MEDICINE

## 2020-08-17 ENCOUNTER — TELEPHONE CONVERTED (OUTPATIENT)
Dept: FAMILY MEDICINE CLINIC | Facility: CLINIC | Age: 60
End: 2020-08-17
Attending: FAMILY MEDICINE

## 2020-08-21 ENCOUNTER — HOSPITAL ENCOUNTER (OUTPATIENT)
Dept: URGENT CARE | Facility: CLINIC | Age: 60
Discharge: HOME | End: 2020-08-21
Attending: FAMILY MEDICINE

## 2020-09-10 ENCOUNTER — OFFICE VISIT CONVERTED (OUTPATIENT)
Dept: ORTHOPEDIC SURGERY | Facility: CLINIC | Age: 60
End: 2020-09-10
Attending: ORTHOPAEDIC SURGERY

## 2020-09-28 ENCOUNTER — HOSPITAL ENCOUNTER (OUTPATIENT)
Dept: GENERAL RADIOLOGY | Facility: HOSPITAL | Age: 60
Discharge: HOME | End: 2020-09-28
Attending: ORTHOPAEDIC SURGERY

## 2020-10-01 ENCOUNTER — OFFICE VISIT CONVERTED (OUTPATIENT)
Dept: ORTHOPEDIC SURGERY | Facility: CLINIC | Age: 60
End: 2020-10-01
Attending: ORTHOPAEDIC SURGERY

## 2020-12-09 ENCOUNTER — TELEPHONE CONVERTED (OUTPATIENT)
Dept: FAMILY MEDICINE CLINIC | Facility: CLINIC | Age: 60
End: 2020-12-09
Attending: FAMILY MEDICINE

## 2020-12-14 ENCOUNTER — HOSPITAL ENCOUNTER (OUTPATIENT)
Dept: GENERAL RADIOLOGY | Facility: HOSPITAL | Age: 60
Discharge: HOME | End: 2020-12-14
Attending: FAMILY MEDICINE

## 2020-12-14 LAB
ALBUMIN SERPL-MCNC: 4.3 G/DL (ref 3.5–5)
ALBUMIN/GLOB SERPL: 1.8 {RATIO} (ref 1.4–2.6)
ALP SERPL-CCNC: 59 U/L (ref 56–155)
ALT SERPL-CCNC: 24 U/L (ref 10–40)
ANION GAP SERPL CALC-SCNC: 18 MMOL/L (ref 8–19)
AST SERPL-CCNC: 23 U/L (ref 15–50)
BASOPHILS # BLD AUTO: 0.04 10*3/UL (ref 0–0.2)
BASOPHILS NFR BLD AUTO: 0.6 % (ref 0–3)
BILIRUB SERPL-MCNC: 0.28 MG/DL (ref 0.2–1.3)
BUN SERPL-MCNC: 13 MG/DL (ref 5–25)
BUN/CREAT SERPL: 13 {RATIO} (ref 6–20)
CALCIUM SERPL-MCNC: 8.8 MG/DL (ref 8.7–10.4)
CHLORIDE SERPL-SCNC: 106 MMOL/L (ref 99–111)
CHOLEST SERPL-MCNC: 187 MG/DL (ref 107–200)
CHOLEST/HDLC SERPL: 3.7 {RATIO} (ref 3–6)
CONV ABS IMM GRAN: 0.01 10*3/UL (ref 0–0.2)
CONV CO2: 24 MMOL/L (ref 22–32)
CONV IMMATURE GRAN: 0.1 % (ref 0–1.8)
CONV TOTAL PROTEIN: 6.7 G/DL (ref 6.3–8.2)
CREAT UR-MCNC: 1.01 MG/DL (ref 0.7–1.2)
DEPRECATED RDW RBC AUTO: 49.4 FL (ref 35.1–43.9)
EOSINOPHIL # BLD AUTO: 0.11 10*3/UL (ref 0–0.7)
EOSINOPHIL # BLD AUTO: 1.6 % (ref 0–7)
ERYTHROCYTE [DISTWIDTH] IN BLOOD BY AUTOMATED COUNT: 13.9 % (ref 11.6–14.4)
GFR SERPLBLD BASED ON 1.73 SQ M-ARVRAT: >60 ML/MIN/{1.73_M2}
GLOBULIN UR ELPH-MCNC: 2.4 G/DL (ref 2–3.5)
GLUCOSE SERPL-MCNC: 90 MG/DL (ref 70–99)
HCT VFR BLD AUTO: 46.9 % (ref 42–52)
HDLC SERPL-MCNC: 51 MG/DL (ref 40–60)
HGB BLD-MCNC: 15 G/DL (ref 14–18)
LDLC SERPL CALC-MCNC: 114 MG/DL (ref 70–100)
LYMPHOCYTES # BLD AUTO: 1.55 10*3/UL (ref 1–5)
LYMPHOCYTES NFR BLD AUTO: 22.4 % (ref 20–45)
MCH RBC QN AUTO: 30.7 PG (ref 27–31)
MCHC RBC AUTO-ENTMCNC: 32 G/DL (ref 33–37)
MCV RBC AUTO: 95.9 FL (ref 80–96)
MONOCYTES # BLD AUTO: 0.6 10*3/UL (ref 0.2–1.2)
MONOCYTES NFR BLD AUTO: 8.7 % (ref 3–10)
NEUTROPHILS # BLD AUTO: 4.62 10*3/UL (ref 2–8)
NEUTROPHILS NFR BLD AUTO: 66.6 % (ref 30–85)
NRBC CBCN: 0 % (ref 0–0.7)
OSMOLALITY SERPL CALC.SUM OF ELEC: 296 MOSM/KG (ref 273–304)
PLATELET # BLD AUTO: 236 10*3/UL (ref 130–400)
PMV BLD AUTO: 11.1 FL (ref 9.4–12.4)
POTASSIUM SERPL-SCNC: 4.9 MMOL/L (ref 3.5–5.3)
PSA SERPL-MCNC: 0.83 NG/ML (ref 0–4)
RBC # BLD AUTO: 4.89 10*6/UL (ref 4.7–6.1)
SODIUM SERPL-SCNC: 143 MMOL/L (ref 135–147)
T4 FREE SERPL-MCNC: 1.8 NG/DL (ref 0.9–1.8)
TESTOST SERPL-MCNC: 898 NG/DL (ref 193–740)
TRIGL SERPL-MCNC: 108 MG/DL (ref 40–150)
TSH SERPL-ACNC: 0.51 M[IU]/L (ref 0.27–4.2)
VLDLC SERPL-MCNC: 22 MG/DL (ref 5–37)
WBC # BLD AUTO: 6.93 10*3/UL (ref 4.8–10.8)

## 2020-12-16 LAB — TESTOSTERONE, FREE: 22.7 PG/ML (ref 6.6–18.1)

## 2021-03-22 ENCOUNTER — HOSPITAL ENCOUNTER (OUTPATIENT)
Dept: GENERAL RADIOLOGY | Facility: HOSPITAL | Age: 61
Discharge: HOME | End: 2021-03-22
Attending: NURSE PRACTITIONER

## 2021-03-22 ENCOUNTER — OFFICE VISIT CONVERTED (OUTPATIENT)
Dept: FAMILY MEDICINE CLINIC | Facility: CLINIC | Age: 61
End: 2021-03-22
Attending: NURSE PRACTITIONER

## 2021-03-22 ENCOUNTER — BULK ORDERING (OUTPATIENT)
Dept: CASE MANAGEMENT | Facility: OTHER | Age: 61
End: 2021-03-22

## 2021-03-22 DIAGNOSIS — Z23 IMMUNIZATION DUE: ICD-10-CM

## 2021-05-10 NOTE — H&P
History and Physical      Patient Name: Rashid Mcknight   Patient ID: [id number]   Sex: Male   YOB: 1960    Primary Care Provider: Cesilia Anderson DO   Referring Provider: Cesilia Anderson DO    Visit Date: September 10, 2020    Provider: Zachary Wilks MD   Location: [facility]   Location Address: [address] [place] [address]  [id number]   Location Phone: [phone]          Chief Complaint  · Right knee pain      History Of Present Illness  Rashid Mcknight is a 59 year old /White male who presents today to [facility].      The patient presents today for evaluation of right knee pain. Patient reports he has underwent multiple knee surgeries of the bilateral knees, mostly for the meniscus. He reports the right knee has been the most problem with swelling of the knee. Patient is on Percocet and Tramadol with pain management.    Patient has tried injections and aspiration in the past with minimal relief of pain.                    Past Medical History  Allergic rhinitis; Arthritis; Asthma; Cricopharyngeal dysphagia; DDD (degenerative disc disease), cervical; DDD (degenerative disc disease), lumbar; Dysphagia; GERD (gastroesophageal reflux disease); Globus sensation; Head injury; Hypogonadism, testicular; Hypothyroidism; Migraine headache; Pain management; Reflux; Seasonal allergies; Seizure; Sjogrens syndrome; Stroke; Stroke (cerebrum); TB (tuberculosis) contact; Throat pain in adult; Thyroid disorder         Past Surgical History  Back; Back surgery; Cervical Fusion; Colonoscopy; EGD; MENISCUS TEAR; Metal implants; Rotator Cuff repair; Thyroidectomy         Medication List  Aspir-81 81 mg oral tablet,delayed release (DR/EC); Cialis 5 mg oral tablet; Depakote  mg oral tablet extended release 24 hr; diclofenac sodium 75 mg oral tablet,delayed release (DR/EC); Evoxac 30 mg oral capsule; fluticasone propionate 50 mcg/actuation nasal spray,suspension; Mirapex 0.5 mg  "oral tablet; multivitamin oral; omeprazole 40 mg oral capsule,delayed release(DR/EC); Percocet  mg oral tablet; ProAir HFA 90 mcg/actuation inhalation HFA aerosol inhaler; Skelaxin 800 mg oral tablet; Synthroid 137 mcg oral tablet; testosterone cypionate 200 mg/mL intramuscular oil         Allergy List  Latex Exam Gloves; NO KNOWN DRUG ALLERGIES       Allergies Reconciled  Family Medical History  Heart Disease; Cancer, Unspecified; Family history of breast cancer; Family history of MS (multiple sclerosis); Osteoporosis; Family history of Arthritis; Family history of heart disease; Family history of stomach cancer; Family history of malignant neoplasm of prostate         Social History  Alcohol (Former); Alcohol Use (Never); lives with spouse; .; Recreational Drug Use (Never); Tobacco (Never); Working         Review of Systems  · Constitutional  o Denies  o : fever, chills, weight loss  · Cardiovascular  o Denies  o : chest pain, shortness of breath  · Gastrointestinal  o Denies  o : liver disease, heartburn, nausea, blood in stools  · Genitourinary  o Denies  o : painful urination, blood in urine  · Integument  o Denies  o : rash, itching  · Neurologic  o Denies  o : headache, weakness, loss of consciousness  · Musculoskeletal  o Denies  o : painful, swollen joints  · Psychiatric  o Denies  o : drug/alcohol addiction, anxiety, depression      Vitals  Date Time BP Position Site L\R Cuff Size HR RR TEMP (F) WT  HT  BMI kg/m2 BSA m2 O2 Sat        09/10/2020 08:47 AM         231lbs 4oz 5'  11\" 32.25 2.29           Physical Examination  · Constitutional  o Appearance  o : well developed, well-nourished, no obvious deformities present  · Head and Face  o Head  o :   § Inspection  § : normocephalic  o Face  o :   § Inspection  § : no facial lesions  · Eyes  o Conjunctivae  o : conjunctivae normal  o Sclerae  o : sclerae white  · Ears, Nose, Mouth and Throat  o Ears  o :   § External Ears  § : appearance " within normal limits  § Hearing  § : intact  o Nose  o :   § External Nose  § : appearance normal  · Neck  o Inspection/Palpation  o : normal appearance  o Range of Motion  o : full range of motion  · Respiratory  o Respiratory Effort  o : breathing unlabored  o Inspection of Chest  o : normal appearance  o Auscultation of Lungs  o : no audible wheezing or rales  · Cardiovascular  o Heart  o : regular rate  · Gastrointestinal  o Abdominal Examination  o : soft and non-tender  · Skin and Subcutaneous Tissue  o General Inspection  o : intact, no rashes  · Psychiatric  o General  o : Alert and oriented x3  o Judgement and Insight  o : judgment and insight intact  o Mood and Affect  o : mood normal, affect appropriate  · Right Knee  o Inspection  o : Well-healed arthroscopy scars, pain with extension, flexion 120 degrees, stable to varus and valgus stress, stable to anterior and posterior drawer, Neurovascularly intact, sensation intact, tenderness over the medial joint line, tender over posterolateral knee, skin intact, no skin discoloration, mild pain with Justina's  · Imaging  o Imaging  o : Xray [facility] July 2020: Mild tricompartmental degenerative change. No acute osseous abnormalities.           Assessment  · Primary osteoarthritis of right knee     715.16/M17.11  · Right knee pain     719.46/M25.561      Plan  · Medications  o Medications have been Reconciled  o Transition of Care or Provider Policy  · Instructions  o Reviewed the patient's Past Medical, Social, and Family history as well as the ROS at today's visit, no changes.  o Call or return if worsening symptoms.  o The above service was scribed by Taya Lopez on my behalf and I attest to the accuracy of the note. kendal  o We discussed his treatment options with conservative management, however, at this time we are recommending he have an MRI to rule out meniscal damage. He wished to proceed and follow-up after MRI.             Electronically Signed by: Taya  SAI Lopez-, Other -Author on September 10, 2020 10:10:46 AM  Electronically Co-signed by: Zachary Wilks MD -Reviewer on September 10, 2020 10:18:30 PM

## 2021-05-12 NOTE — PROGRESS NOTES
Quick Note      Patient Name: Rashid Mcknight   Patient ID: [id number]   Sex: Male   YOB: 1960    Primary Care Provider: Cesilia Anderson DO   Referring Provider: Cesilia Anderson DO    Visit Date: March 27, 2020    Provider: Cesilia Anderson DO   Location: [facility]   Location Address: [address] [place] [address] [address]  [place], [place]  [id number]   Location Phone: [phone]          History Of Present Illness  TELEHEALTH VISIT  Chief Complaint: CHIEF COMPLAINT   Rashid Mcknight is a 59 year old /White male who is presenting for evaluation via telehealth visit. Verbal consent obtained before beginning visit.   Provider spent 14 minutes with the patient during telehealth visit.   The following staff were present during this visit: Garrison ESPINOZA, Dr.Kerry Monica FLANAGAN.   Past Medical History/Overview of Patient Symptoms     The patient is being managed today via telecommunication.  He has a past medical history significant for Sjogren's syndrome, asthma, GERD, epilepsy, degenerative disc disease, hypothyroidism, hypogonadism, restless leg syndrome, obesity and erectile dysfunction.    Labs 1/16/2020: Testosterone 452.    Labs 9/30/2019: Creatinine 0.92, GFR greater than 60, potassium 4.3, hemoglobin 15.7, platelets 293, PSA 0.71, TSH 2.05, free T4 1.5.    The patient says he is doing fine at today's visit.  He denies any recent sick contacts.  He denies fever or chills.  He denies dry hacking cough.  He has been to [place] recently to visit his children and grandchildren.    He is taking all medications as prescribed.  He denies any issues with fatigue.  He does state that his wife is giving him his testosterone injections at home.  He is using his asthma rescue inhaler occasionally.  He says he does not use it every day.  He says that if he goes out walking or exercises he will need to use it at that time.    He has not had his thyroid level checked in 6 months.  He is currently  taking 274 mcg of Synthroid daily.    He has no other complaints today denies chest pains, shortness of breath, weakness, numbness, nausea, vomiting, diarrhea, dizziness or syncopal event.           Assessment  · Asthma     493.90/J45.909  · Hypogonadism in male     257.2/E29.1  · Hypothyroidism     244.9/E03.9  · Encounter for medication monitoring     V58.83/Z51.81      Plan  · Orders  o Thyroid Profile (57945, 93930, THYII) - 244.9/E03.9, V58.83/Z51.81 - 03/27/2020  o Hemoglobin (Hgb) (39922) - 257.2/E29.1, V58.83/Z51.81 - 03/27/2020  o Hematocrit (53691) - 257.2/E29.1, V58.83/Z51.81 - 03/27/2020  · Medications  o Medications have been Reconciled  o Transition of Care or Provider Policy  · Instructions  o Plan Of Care:   · Disposition  o Follow Up in 3 months     1.  Hypothyroidism: The patient will be continued on 274 mcg of Synthroid daily.  He was given order today for thyroid profile to be evaluated manage according to findings.  2.  Hypogonadism: The patient's currently doing well with testosterone replacement therapy.  His most recent testosterone level was within normal limits.  3.  Asthma: The patient's asthma appears to be stable with only occasional use of his rescue inhaler.  Follow-up 3 months.             Electronically Signed by: Cesilia Anderson DO -Author on March 27, 2020 11:39:15 AM

## 2021-05-13 NOTE — PROGRESS NOTES
Progress Note      Patient Name: Rashid Mcknight   Patient ID: [id number]   Sex: Male   YOB: 1960    Primary Care Provider: Cesilia Anderson DO   Referring Provider: Cesilia Anderson DO    Visit Date: May 11, 2020    Provider: Cesilia Anderson DO   Location: [facility]   Location Address: [address] [place] [address] [address]  [place], [place]  [id number]   Location Phone: [phone]          Chief Complaint  · Pt complains of lots of body pain and inflammation.      History Of Present Illness  Rashid Mcknight is a 59 year old /White male who presents for evaluation and treatment of:      He is here today for for an acute visit and for the management of his chronic medical conditions.  He has past medical history is significant for Sjogren's syndrome, asthma, GERD, epilepsy, degenerative disc disease, hypothyroidism, obesity, hypogonadism and erectile dysfunction.      He recently was seen by ENT for evaluation for dysphagia.  He was given a laryngoscopy and it revealed cricopharyngeal dysphasia. He is having some problems swallowing today. His Omeprazole was increased to twice per day.     Labs 3/30/2020: Hemoglobin 15.4, hematocrit 49.3, TSH 0.654, free T4 1.8.    Labs 10/01/2019: GFR greater than 60, potassium 4.3, sodium 139, TSH 2.050, free T4 1.5, CBC 15.7, PSA 0.71    Labs 1/25/2019: LDL 92, Tri 133, HDL 48.    The patient is taken 274 mcg of Synthroid daily for his hypothyroidism. The patient is taken 200 mg every 2 weeks of testosterone replacement.    He is back to taking Mirapex 2 tablets at nighttime and it is controlling his RLS.    He has not been checking his blood pressures at home.     He says that he has more energy now since starting testosterone preplacement.    He is asking that his Viagra be switched to Cialis stating that the Viagra is not working as well as it used to.    The patient's complaint today that he is just aching all over.  He says that sometimes  of the year he has increasing joint pain.  He says it could possibly have some to do with his Sjogren syndrome.  He says when he has these events or flares he will get a large injection of steroids and it makes him feel a lot better.    He has no other complaints today denies chest pain, shortness of breath, weakness, numbness, nausea, vomiting, diarrhea, dizziness or syncopal event.                 Past Medical History  Disease Name Date Onset Notes   Allergic rhinitis --  --    Arthritis --  --    Asthma --  --    Cricopharyngeal dysphagia --  --    DDD (degenerative disc disease), cervical 09/14/2018 --    DDD (degenerative disc disease), lumbar 09/14/2018 --    Dysphagia --  --    GERD (gastroesophageal reflux disease) 05/06/2019 --    Globus sensation --  --    Head injury --  --    Hypogonadism, testicular 09/14/2018 --    Hypothyroidism --  --    Migraine headache --  --    Pain management --  --    Seizure --  --    Sjogrens syndrome 09/14/2018 --    Stroke (cerebrum) --  --    TB (tuberculosis) contact --  --    Throat pain in adult --  --          Past Surgical History  Procedure Name Date Notes   Back surgery --  --    Cervical Fusion 2010 11 12 --    Colonoscopy 04/2019 --    EGD --  --    MENISCUS TEAR [id number] --    Rotator Cuff repair [id number] --    Thyroidectomy 2018 --          Medication List  Name Date Started Instructions   Aspir-81 81 mg oral tablet,delayed release (DR/EC)  take 1 tablet (81 mg) by oral route once daily   Cialis 5 mg oral tablet 07/28/2020 take 1 tablet (5 mg) by oral route once daily for 30 days   Depakote  mg oral tablet extended release 24 hr 08/06/2020 take 1 tablet by oral route 2 times a day for 90 days   diclofenac sodium 75 mg oral tablet,delayed release (DR/EC) 07/09/2020 take 1 tablet (75 mg) by oral route 2 times per day for 30 days   Evoxac 30 mg oral capsule 11/14/2019 take 1 capsule (30 mg) by oral route 3 times per day for 90 days   fluticasone propionate  50 mcg/actuation nasal spray,suspension 03/17/2020 spray 1 - 2 sprays (50 - 100 mcg) in each nostril by intranasal route once daily as needed for 30 days   Mirapex 0.5 mg oral tablet 08/09/2020 take 1 tablet (0.5 mg) by oral route 3 for 90 days   multivitamin oral  take 1 by oral route 1   omeprazole 40 mg oral capsule,delayed release(DR/EC) 05/29/2020 TAKE ONE CAPSULE BY MOUTH TWICE DAILY   Percocet  mg oral tablet  take 4 tablets by oral route daily   ProAir HFA 90 mcg/actuation inhalation HFA aerosol inhaler 03/17/2020 inhale 2 puffs (180 mcg) by inhalation route every 4 hours as needed for 30 days   Skelaxin 800 mg oral tablet 07/27/2020 take 1 tablet (800 mg) by oral route 3 times daily as needed   Synthroid 137 mcg oral tablet 07/28/2020 take 1 tablet by oral route 2 times a day for 90 days for 90 days   testosterone cypionate 200 mg/mL intramuscular oil 08/31/2020 INJECT 1 ML INTRAMUSCULARLY EVERY 2 WEEKS         Allergy List  Allergen Name Date Reaction Notes   NO KNOWN DRUG ALLERGIES --  --  --          Family Medical History  Disease Name Relative/Age Notes   Family history of breast cancer Grandmother (paternal)/   --    Family history of MS (multiple sclerosis) Mother/  Son/   --    Family history of heart disease Father/  Grandmother (maternal)/  Grandmother (paternal)/   --    Family history of stomach cancer Grandmother (paternal)/   --    Family history of malignant neoplasm of prostate Father/   --          Social History  Finding Status Start/Stop Quantity Notes   Alcohol Former --/20 --  07/22/2020 - Stopped drinking in 1979   Tobacco Never --/-- --  --          Immunizations  NameDate Admin Mfg Trade Name Lot Number Route Inj VIS Given VIS Publication   InfluenzaRefused 09/20/2019 NE Not Entered  NE NE     Comments:    Tdap11/01/2019 SKB BOOSTRIX [id number] IM LD 11/01/2019    Comments: NDC# [id number]         Review of Systems  · Constitutional  o * See HPI  · HENT  o * See  "HPI  · Cardiovascular  o * See HPI  · Respiratory  o * See HPI  · Gastrointestinal  o * See HPI  · Musculoskeletal  o * See HPI      Vitals  Date Time BP Position Site L\R Cuff Size HR RR TEMP (F) WT  HT  BMI kg/m2 BSA m2 O2 Sat        05/11/2020 03:14 /85 Sitting    84 - R 14 96.1 253lbs 10oz 5'  11\" 35.37 2.4 97 %          Physical Examination  · Constitutional  o Appearance  o : obese, well developed, alert, NAD  · Head and Face  o Head  o :   § Inspection  § : atraumatic, normocephalic  · Eyes  o Eyes  o : extraocular movements intact, no scleral icterus, no conjunctival injection  · Ears, Nose, Mouth and Throat  o Ears  o :   § External Ears  § : normal  o Nose  o :   § Intranasal Exam  § : nares patent  o Oral Cavity  o :   § Oral Mucosa  § : moist mucous membranes  · Respiratory  o Respiratory Effort  o : breathing comfortably, symmetric chest rise  o Auscultation of Lungs  o : clear to asculatation bilaterally, no wheezes, rales, or rhonchii  · Cardiovascular  o Heart  o :   § Auscultation of Heart  § : regular rate and rhythm, no murmurs, rubs, or gallops  o Peripheral Vascular System  o :   § Extremities  § : no edema  · Skin and Subcutaneous Tissue  o General Inspection  o : no rashes present, no lesions present, no areas of discoloration, skin turgor normal  · Neurologic  o Mental Status Examination  o :   § Orientation  § : grossly oriented to person, place and time  o Cranial Nerves  o : crainial nerves 2-12 grossly intact  o Gait and Station  o :   § Gait Screening  § : normal gait  · Psychiatric  o General  o : normal mood and affect          Assessment  · Sjogrens syndrome     710.2/M35.00  · Hypogonadism in male     257.2/E29.1  · Erectile dysfunction     607.84/N52.9  · Elevated blood pressure reading in office without diagnosis of hypertension     796.2/R03.0    Problems Reconciled  Plan  · Orders  o IM/SQ - Injection Fee [facility] (90258) - - 05/11/2020  o ACO-39: Current medications updated " and reviewed () - - 05/11/2020  o 8.00 - Kenalog Injection 80mg (-8) - 710.2/M35.00 - 05/11/2020   Injection - Kenalog 80 mg; Dose: 80 mg; Site: Left Gluteus; Route: intramuscular; Date: 05/11/2020 15:59:32; Exp: 06/01/2021; Lot: [id number]; Mfg: [employer] TradeName: triamcinolone; Location: [facility]; Administered By: Aretha Chu Other; Comment: Pt tolerated well. Pt refused to wait after injection for recommended wait time to insure no reactions.  · Medications  o Cialis 2.5 mg oral tablet   SIG: take 1 tablet (2.5 mg) by oral route once daily for 30 days   DISP: (30) tablets with 3 refills  Prescribed on 05/11/2020     o sildenafil 50 mg oral tablet   SIG: take 1 tablet (50 mg) by oral route once daily as needed approximately 1 hour before sexual activity for 30 days   DISP: (10) Tablet with 2 refills  Discontinued on 05/11/2020     o Medications have been Reconciled  o Transition of Care or Provider Policy  · Instructions  o Patient was educated/instructed on their diagnosis, treatment and medications prior to discharge from the clinic today.  · Disposition  o Call or Return if symptoms worsen or persist.     1.  Elevated blood pressure reading without the diagnosis of hypertension: The patient was instructed to take his blood pressure at home and keep a log to bring back to his next clinic appointment.  2.  Sjogren's syndrome: The patient was given a large injection today of Kenalog 80 mg.  3.  Erectile dysfunction: The patient was given a prescription today for Cialis to be taken as directed.  4.  Hypogonadism: The patient will be continued on steroid replacement therapy.  At his next clinic appointment his hemoglobin and testosterone levels will be checked.  Follow-up 3 months.             Electronically Signed by: Cesilia Anderson DO - on September 9, 2020 06:18:56 PM

## 2021-05-13 NOTE — PROGRESS NOTES
Progress Note      Patient Name: Rashid Mcknight   Patient ID: [id number]   Sex: Male   YOB: 1960    Primary Care Provider: Cesilia Anderson DO   Referring Provider: Cesilia Anderson DO    Visit Date: August 17, 2020    Provider: Cesilia Anderson DO   Location: [facility]   Location Address: [address] [place] [address] [address]  [place], [place]  [id number]   Location Phone: [phone]          Chief Complaint  · Right Knee Pain  · Thinks it is time for a MRI of his knee.  · Pain is keeping him up at night and affecting his daily life.   · Pain radiates up into his hip and down his leg.       History Of Present Illness  TELEHEALTH TELEPHONE VISIT  Rashid Mcknight is a 59 year old /White male who is presenting for evaluation via telehealth telephone visit. Verbal consent obtained before beginning visit.   Provider spent 13 minutes with patient during telehealth visit.   The following staff were present during this visit: Aretha Chu LPN, Cesilia Anderson DO   Past Medical History/Overview of Patient Symptoms  Rashid Mcknight is a 59 year old /White male who presents for evaluation and treatment of:      He is being managed via telephone today for follow-up for right knee pain.  He has a past medical history is significant for Sjogren's syndrome, asthma, GERD, epilepsy, degenerative disc disease, hypothyroidism, cricopharyngeal dysphasia, obesity, hypogonadism and erectile dysfunction.      Labs 3/30/2020: Hemoglobin 15.4, hematocrit 49.3, TSH 0.654, free T4 1.8.    Labs 10/01/2019: GFR greater than 60, potassium 4.3, sodium 139, TSH 2.050, free T4 1.5, CBC 15.7, PSA 0.71    Labs 1/25/2019: LDL 92, Tri 133, HDL 48.    The patient is taken 274 mcg of Synthroid daily for his hypothyroidism. The patient is taken 200 mg every 2 weeks of testosterone replacement.    He is back to taking Mirapex 2 tablets at nighttime and it is controlling his RLS.    He says that he has more energy now  since starting testosterone preplacement.    He is having worsening right knee pain. He has had surgery in the past. He says that he has been having pain for over a year. He is having pain radiation up his leg. It is causing him to have trouble sleeping.    He has no other complaints today denies chest pain, shortness of breath, weakness, numbness, nausea, vomiting, diarrhea, dizziness or syncopal event.       Past Medical History  Disease Name Date Onset Notes   Allergic rhinitis --  --    Arthritis --  --    Asthma --  --    Cricopharyngeal dysphagia --  --    DDD (degenerative disc disease), cervical 09/14/2018 --    DDD (degenerative disc disease), lumbar 09/14/2018 --    Dysphagia --  --    GERD (gastroesophageal reflux disease) 05/06/2019 --    Globus sensation --  --    Head injury --  --    Hypogonadism, testicular 09/14/2018 --    Hypothyroidism --  --    Migraine headache --  --    Pain management --  --    Seizure --  --    Sjogrens syndrome 09/14/2018 --    Stroke (cerebrum) --  --    TB (tuberculosis) contact --  --    Throat pain in adult --  --          Past Surgical History  Procedure Name Date Notes   Back surgery --  --    Cervical Fusion 2010 11 12 --    Colonoscopy 04/2019 --    EGD --  --    MENISCUS TEAR 3177-2035 --    Rotator Cuff repair [id number] --    Thyroidectomy 2018 --          Medication List  Name Date Started Instructions   Aspir-81 81 mg oral tablet,delayed release (DR/EC)  take 1 tablet (81 mg) by oral route once daily   Cialis 5 mg oral tablet 07/28/2020 take 1 tablet (5 mg) by oral route once daily for 30 days   Depakote  mg oral tablet extended release 24 hr 08/06/2020 take 1 tablet by oral route 2 times a day for 90 days   diclofenac sodium 75 mg oral tablet,delayed release (DR/EC) 07/09/2020 take 1 tablet (75 mg) by oral route 2 times per day for 30 days   Evoxac 30 mg oral capsule 11/14/2019 take 1 capsule (30 mg) by oral route 3 times per day for 90 days   fluticasone  propionate 50 mcg/actuation nasal spray,suspension 03/17/2020 spray 1 - 2 sprays (50 - 100 mcg) in each nostril by intranasal route once daily as needed for 30 days   Mirapex 0.5 mg oral tablet 08/09/2020 take 1 tablet (0.5 mg) by oral route 3 for 90 days   multivitamin oral  take 1 by oral route 1   omeprazole 40 mg oral capsule,delayed release(DR/EC) 05/29/2020 TAKE ONE CAPSULE BY MOUTH TWICE DAILY   Percocet  mg oral tablet  take 4 tablets by oral route daily   ProAir HFA 90 mcg/actuation inhalation HFA aerosol inhaler 03/17/2020 inhale 2 puffs (180 mcg) by inhalation route every 4 hours as needed for 30 days   Skelaxin 800 mg oral tablet 07/27/2020 take 1 tablet (800 mg) by oral route 3 times daily as needed   Synthroid 137 mcg oral tablet 07/28/2020 take 1 tablet by oral route 2 times a day for 90 days for 90 days   testosterone cypionate 200 mg/mL intramuscular oil 06/08/2020 inject 1 milliliters (200 mg) by intramuscular route every 2 weeks         Allergy List  Allergen Name Date Reaction Notes   NO KNOWN DRUG ALLERGIES --  --  --          Family Medical History  Disease Name Relative/Age Notes   Family history of breast cancer Grandmother (paternal)/   --    Family history of MS (multiple sclerosis) Mother/  Son/   --    Family history of heart disease Father/  Grandmother (maternal)/  Grandmother (paternal)/   --    Family history of stomach cancer Grandmother (paternal)/   --    Family history of malignant neoplasm of prostate Father/   --          Social History  Finding Status Start/Stop Quantity Notes   Alcohol Former --/20 --  07/22/2020 - Stopped drinking in 1979   Tobacco Never --/-- --  --          Immunizations  NameDate Admin Mfg Trade Name Lot Number Route Inj VIS Given VIS Publication   InfluenzaRefused 09/20/2019 NE Not Entered  NE NE     Comments:    Tdap11/01/2019 SKB BOOSTRIX [id number] IM LD 11/01/2019    Comments: NDC# [id number]             Assessment  · Right knee  "pain     719.46/M25.561      Plan  · Orders  o ACO-39: Current medications updated and reviewed () - - 08/17/2020  o MRI knee right wo contrast (36887) - 719.46/M25.561 - 08/17/2020  o ORTHOPEDIC CONSULTATION (ORTHO) - 719.46/M25.561 - 08/17/2020   History of two \"knee surgeries\" in the past.  · Medications  o Medications have been Reconciled  o Transition of Care or Provider Policy  · Instructions  o Plan Of Care:      1.  Right knee pain: The patient was given a referral to orthopedic surgery today as well as an order was placed for an MRI without contrast of the right knee.  He was told to call back or go to the ER if his symptoms worsen.  Follow-up as needed.             Electronically Signed by: Cesilia Anderson DO -Author on August 27, 2020 10:00:27 PM  "

## 2021-05-13 NOTE — PROGRESS NOTES
Progress Note      Patient Name: Rashid Mcknight   Patient ID: [id number]   Sex: Male   YOB: 1960    Primary Care Provider: Cesilia Anderson DO   Referring Provider: Cesilia Anderson DO    Visit Date: July 22, 2020    Provider: Cesilia Anderson DO   Location: [facility]   Location Address: [address] [place] [address] [address]  [address]  [place], [place]  [id number]   Location Phone: [phone]          Chief Complaint  · pt here for Right Knee pain and swelling      History Of Present Illness  Rashid Mcknight is a 59 year old /White male who presents for evaluation and treatment of:      He is here today for for an acute visit.  He has a past medical history is significant for Sjogren's syndrome, asthma, GERD, epilepsy, degenerative disc disease, hypothyroidism, cricopharyngeal dysphasia, obesity, hypogonadism and erectile dysfunction.      Labs 3/30/2020: Hemoglobin 15.4, hematocrit 49.3, TSH 0.654, free T4 1.8.    Labs 10/01/2019: GFR greater than 60, potassium 4.3, sodium 139, TSH 2.050, free T4 1.5, CBC 15.7, PSA 0.71    Labs 1/25/2019: LDL 92, Tri 133, HDL 48.    The patient is taken 274 mcg of Synthroid daily for his hypothyroidism. The patient is taken 200 mg every 2 weeks of testosterone replacement.    He is back to taking Mirapex 2 tablets at nighttime and it is controlling his RLS.    He says that he has more energy now since starting testosterone preplacement.    He is asking that his Viagra be switched to Cialis stating that the Viagra is not working as well as it used to.    He is complainging today of right knee pain. He is having swelling as well. He has a history of right knee menisicus repair three times several years in the past. He denies any recent trauma.     His home pressure is 120/70's at home but, sometimes it is elevated.     He has no other complaints today denies chest pain, shortness of breath, weakness, numbness, nausea, vomiting, diarrhea, dizziness or syncopal  event.               Past Medical History  Disease Name Date Onset Notes   Allergic rhinitis --  --    Arthritis --  --    Asthma --  --    Cricopharyngeal dysphagia --  --    DDD (degenerative disc disease), cervical 09/14/2018 --    DDD (degenerative disc disease), lumbar 09/14/2018 --    Dysphagia --  --    GERD (gastroesophageal reflux disease) 05/06/2019 --    Globus sensation --  --    Head injury --  --    Hypogonadism, testicular 09/14/2018 --    Hypothyroidism --  --    Migraine headache --  --    Pain management --  --    Seizure --  --    Sjogrens syndrome 09/14/2018 --    Stroke (cerebrum) --  --    TB (tuberculosis) contact --  --    Throat pain in adult --  --          Past Surgical History  Procedure Name Date Notes   Back surgery --  --    Cervical Fusion 2010 11 12 --    Colonoscopy 04/2019 --    EGD --  --    MENISCUS TEAR 9651-6766 --    Rotator Cuff repair [id number] --    Thyroidectomy 2018 --          Medication List  Name Date Started Instructions   Aspir-81 81 mg oral tablet,delayed release (DR/EC)  take 1 tablet (81 mg) by oral route once daily   Cialis 5 mg oral tablet 07/14/2020 take 1 tablet (5 mg) by oral route once daily for 30 days   Depakote  mg oral tablet extended release 24 hr 08/22/2019 take 1 tablet by oral route 2 times a day for 90 days   diclofenac sodium 75 mg oral tablet,delayed release (DR/EC) 07/09/2020 take 1 tablet (75 mg) by oral route 2 times per day for 30 days   Evoxac 30 mg oral capsule 11/14/2019 take 1 capsule (30 mg) by oral route 3 times per day for 90 days   fluticasone propionate 50 mcg/actuation nasal spray,suspension 03/17/2020 spray 1 - 2 sprays (50 - 100 mcg) in each nostril by intranasal route once daily as needed for 30 days   Mirapex 0.5 mg oral tablet  take 1 tablet (0.5 mg) by oral route 2-3 hours before bedtime   multivitamin oral  take 1 by oral route 1   omeprazole 40 mg oral capsule,delayed release(DR/EC) 05/29/2020 TAKE ONE CAPSULE BY MOUTH  "TWICE DAILY   Percocet  mg oral tablet  take 4 tablets by oral route daily   ProAir HFA 90 mcg/actuation inhalation HFA aerosol inhaler 03/17/2020 inhale 2 puffs (180 mcg) by inhalation route every 4 hours as needed for 30 days   Synthroid 137 mcg oral tablet 07/14/2020 take 1 tablet by oral route 2 times a day for 90 days for 90 days   testosterone cypionate 200 mg/mL intramuscular oil 06/08/2020 inject 1 milliliters (200 mg) by intramuscular route every 2 weeks         Allergy List  Allergen Name Date Reaction Notes   NO KNOWN DRUG ALLERGIES --  --  --          Family Medical History  Disease Name Relative/Age Notes   Family history of breast cancer Grandmother (paternal)/   --    Family history of MS (multiple sclerosis) Mother/  Son/   --    Family history of heart disease Father/  Grandmother (maternal)/  Grandmother (paternal)/   --    Family history of stomach cancer Grandmother (paternal)/   --    Family history of malignant neoplasm of prostate Father/   --          Social History  Finding Status Start/Stop Quantity Notes   Alcohol Former --/20 --  07/22/2020 - Stopped drinking in 1979   Tobacco Never --/-- --  --          Immunizations  NameDate Admin Mfg Trade Name Lot Number Route Inj VIS Given VIS Publication   InfluenzaRefused 09/20/2019 NE Not Entered  NE NE     Comments:    Tdap11/01/2019 SKB BOOSTRIX [id number] IM LD 11/01/2019    Comments: NDC# [id number]         Review of Systems  · Constitutional  o * See HPI  · HENT  o * See HPI  · Cardiovascular  o * See HPI  · Respiratory  o * See HPI  · Gastrointestinal  o * See HPI  · Musculoskeletal  o * See HPI      Vitals  Date Time BP Position Site L\R Cuff Size HR RR TEMP (F) WT  HT  BMI kg/m2 BSA m2 O2 Sat HC       07/22/2020 03:28 /76 Sitting    88 - R  98.1 237lbs 0oz 5'  11\" 33.05 2.32 96 %          Physical Examination  · Constitutional  o Appearance  o : overweight, well developed, alert, Mild distress.  · Head and Face  o Head  o : "   § Inspection  § : atraumatic, normocephalic  · Eyes  o Eyes  o : extraocular movements intact, no scleral icterus, no conjunctival injection  · Ears, Nose, Mouth and Throat  o Ears  o :   § External Ears  § : normal  o Nose  o :   § Intranasal Exam  § : nares patent  o Oral Cavity  o :   § Oral Mucosa  § : moist mucous membranes  · Respiratory  o Respiratory Effort  o : breathing comfortably, symmetric chest rise  o Auscultation of Lungs  o : clear to asculatation bilaterally, no wheezes, rales, or rhonchii  · Cardiovascular  o Heart  o :   § Auscultation of Heart  § : regular rate and rhythm, no murmurs, rubs, or gallops  o Peripheral Vascular System  o :   § Extremities  § : no edema  · Skin and Subcutaneous Tissue  o General Inspection  o : no rashes present, no lesions present, no areas of discoloration, skin turgor normal  · Neurologic  o Mental Status Examination  o :   § Orientation  § : grossly oriented to person, place and time  o Cranial Nerves  o : crainial nerves 2-12 grossly intact  o Gait and Station  o :   § Gait Screening  § : normal gait  · Psychiatric  o General  o : normal mood and affect     Right knee: Slight joint effusion appreciated.  No laxity appreciated.               Assessment  · Right knee pain     719.46/M25.561      Plan  · Orders  o ACO-39: Current medications updated and reviewed () - - 07/22/2020  o Xray knee right [facility] (71154-RA) - 719.46/M25.561 - 07/22/2020  · Medications  o Medications have been Reconciled  o Transition of Care or Provider Policy  · Instructions  o Patient was educated/instructed on their diagnosis, treatment and medications prior to discharge from the clinic today.  · Disposition  o Call or Return if symptoms worsen or persist.     Right knee pain with history of meniscal tear: The patient was given order today for an x-ray and MRI of the right knee to be managed according findings.  He was also given a referral to orthopedics for further  medical evaluation and management is necessary.  Follow-up as needed.             Electronically Signed by: Cesilia Anderson DO -Author on July 23, 2020 08:28:57 AM

## 2021-05-13 NOTE — PROGRESS NOTES
Progress Note      Patient Name: Rashid Mcknight   Patient ID: [id number]   Sex: Male   YOB: 1960    Primary Care Provider: Cesilia Anderson DO   Referring Provider: Cesilia Anderson DO    Visit Date: December 9, 2020    Provider: Cesilia Anderson DO   Location: [facility]   Location Address: [address] [place] [address]  [id number]   Location Phone: [phone]          Chief Complaint  · Medication refills   · Allergy and asthma       History Of Present Illness  Rashid Mcknight is a 60 year old /White male who presents for evaluation and treatment of:   TELEHEALTH TELEPHONE VISIT  Rashid Mcknight is a 60 year old /White male who is presenting for evaluation via telehealth telephone visit. Verbal consent obtained before beginning visit.   Provider spent 13 minutes with patient during telehealth visit.   The following staff were present during this visit: Garrison ESPINOZA, Dr.Kerry Monica FLANAGAN.   Past Medical History/Overview of Patient Symptoms     He is being managed via telephone today for his chronic medical conditions.  He has a past medical history is significant for Sjogren's syndrome, asthma, GERD, epilepsy, degenerative disc disease, hypothyroidism, cricopharyngeal dysphasia, obesity, hypogonadism and erectile dysfunction.      Labs 3/30/2020: Hemoglobin 15.4, hematocrit 49.3, TSH 0.654, free T4 1.8.    Labs 10/01/2019: GFR greater than 60, potassium 4.3, sodium 139, TSH 2.050, free T4 1.5, CBC 15.7, PSA 0.71    Labs 1/25/2019: LDL 92, Tri 133, HDL 48.    He says that his health is stable. He is staying active and has lost about 20 lbs.     The patient is taken 274 mcg of Synthroid daily for his hypothyroidism. The patient is taken 200 mg every 2 weeks of testosterone replacement.    He says that he has more energy now since starting testosterone preplacement.    He has been having some chest congestion and cough. He says that it is made better with  albuterol inhaler.     He has no other complaints today denies chest pain, shortness of breath, weakness, numbness, nausea, vomiting, diarrhea, dizziness or syncopal event.            Past Medical History  Disease Name Date Onset Notes   Allergic rhinitis --  --    Arthritis --  --    Asthma --  --    Cricopharyngeal dysphagia --  --    DDD (degenerative disc disease), cervical 09/14/2018 --    DDD (degenerative disc disease), lumbar 09/14/2018 --    Dysphagia --  --    GERD (gastroesophageal reflux disease) 05/06/2019 --    Globus sensation --  --    Head injury --  --    Hypogonadism, testicular 09/14/2018 --    Hypothyroidism --  --    Migraine headache --  --    Pain management --  --    Reflux --  --    Seasonal allergies --  --    Seizure --  --    Sjogrens syndrome 09/14/2018 --    Stroke --  --    Stroke (cerebrum) --  --    TB (tuberculosis) contact --  --    Throat pain in adult --  --    Thyroid disorder --  --          Past Surgical History  Procedure Name Date Notes   Back --  --    Back surgery --  --    Cervical Fusion 2010 11 12 --    Colonoscopy 04/2019 --    EGD --  --    MENISCUS TEAR 9165-3652 --    Metal implants --  --    Rotator Cuff repair 8412-5494 --    Thyroidectomy 2018 --          Medication List  Name Date Started Instructions   Aspir-81 81 mg oral tablet,delayed release (DR/EC)  take 1 tablet (81 mg) by oral route once daily   Cialis 10 mg oral tablet 12/09/2020 take 1 tablet (10 mg) by oral route once daily for 30 days   Depakote  mg oral tablet extended release 24 hr 08/06/2020 take 1 tablet by oral route 2 times a day for 90 days   diclofenac sodium 75 mg oral tablet,delayed release (DR/EC) 11/30/2020 TAKE ONE TABLET BY MOUTH TWICE DAILY   Evoxac 30 mg oral capsule 12/09/2020 take 1 capsule (30 mg) by oral route 3 times per day for 90 days   fluticasone propionate 50 mcg/actuation nasal spray,suspension 03/17/2020 spray 1 - 2 sprays (50 - 100 mcg) in each nostril by intranasal  route once daily as needed for 30 days   Mirapex 0.5 mg oral tablet 08/09/2020 take 1 tablet (0.5 mg) by oral route 3 for 90 days   multivitamin oral  take 1 by oral route 1   omeprazole 40 mg oral capsule,delayed release(DR/EC) 12/09/2020 TAKE ONE CAPSULE BY MOUTH TWICE DAILY for 30 days   Percocet  mg oral tablet  take 4 tablets by oral route daily   ProAir HFA 90 mcg/actuation inhalation HFA aerosol inhaler 12/09/2020 inhale 2 puffs (180 mcg) by inhalation route every 4 hours as needed for 30 days   Skelaxin 800 mg oral tablet 07/27/2020 take 1 tablet (800 mg) by oral route 3 times daily as needed   Synthroid 137 mcg oral tablet 07/28/2020 take 1 tablet by oral route 2 times a day for 90 days for 90 days   testosterone cypionate 200 mg/mL intramuscular oil 08/31/2020 INJECT 1 ML INTRAMUSCULARLY EVERY 2 WEEKS         Allergy List  Allergen Name Date Reaction Notes   Latex Exam Gloves --  --  --    NO KNOWN DRUG ALLERGIES --  --  --          Family Medical History  Disease Name Relative/Age Notes   Heart Disease Father/   Father   Cancer, Unspecified Father/   Father   Family history of breast cancer Grandmother (paternal)/   --    Family history of MS (multiple sclerosis) Mother/  Son/   --    Osteoporosis Mother/   Mother   Family history of Arthritis Father/   Father   Family history of heart disease Father/  Grandmother (maternal)/  Grandmother (paternal)/   --    Family history of stomach cancer Grandmother (paternal)/   --    Family history of malignant neoplasm of prostate Father/   --          Social History  Finding Status Start/Stop Quantity Notes   Alcohol Former --/20 --  07/22/2020 - Stopped drinking in 1979   Alcohol Use Never --/-- --  does not drink   lives with spouse --  --/-- --  --    . --  --/-- --  --    Recreational Drug Use Never --/-- --  no   Tobacco Never --/-- --  never smoker   Working --  --/-- --  --          Immunizations  NameDate Admin Mfg Trade Name Lot Number Route  Inj VIS Given VIS Publication   InfluenzaRefused 09/20/2019 NE Not Entered  NE NE     Comments:    Tdap11/01/2019 SKB BOOSTRIX [id number] IM LD 11/01/2019    Comments: NDC# [id number]                 Assessment  · Hypothyroidism     244.9/E03.9  He will be continued on thyroid replacement and a repeat thyroid profile was ordered today.   · Obesity     278.00/E66.9  He was encouraged to continue wt. loss.   · Screening for depression     V79.0/Z13.89  · Hypogonadism in male     257.2/E29.1  He is currently doing well with testosterone replacement therapy. Luther, controlled medication contract are in the chart. He understands the increased risk of prostate cancer. A PSA was ordered today as well as testosterone analysis.  · ED (erectile dysfunction)     607.84/N52.9  His Cialis was increased from 5 to 10 mg daily.       Plan  · Orders  o Male Physical Primary Care Panel (CMP, CBC, TSH, Lipid, PSA) [facility] (, 53883, 57976, 42413, 54818, 68379) - 257.2/E29.1, 244.9/E03.9 - 12/09/2020  o Thyroid Profile (95496, THYII, 28114) - 244.9/E03.9 - 12/09/2020  o ACO-39: Current medications updated and reviewed (, 1159F) - - 12/09/2020  o ACO-18: Negative screen for clinical depression using a standardized tool () - - 12/09/2020  o ACO-14: Influenza immunization was not administered for reasons documented [facility] () - - 12/09/2020   Patient declined   o ACO-19: Colorectal cancer screening results documented and reviewed (3017F) - - 12/09/2020  o Testosterone, Free (Direct) (75618) - 257.2/E29.1 - 12/09/2020  o Testosterone (Total) (74988) - 257.2/E29.1 - 12/09/2020  · Medications  o Cialis 10 mg oral tablet   SIG: take 1 tablet (10 mg) by oral route once daily for 30 days   DISP: (30) Tablet with 5 refills  Adjusted on 12/09/2020     o Evoxac 30 mg oral capsule   SIG: take 1 capsule (30 mg) by oral route 3 times per day for 90 days   DISP: (270) Capsule with 5 refills  Refilled on 12/09/2020     o omeprazole 40 mg oral  capsule,delayed release(DR/EC)   SIG: TAKE ONE CAPSULE BY MOUTH TWICE DAILY for 30 days   DISP: (60) Capsule with 5 refills  Refilled on 12/09/2020     o ProAir HFA 90 mcg/actuation inhalation HFA aerosol inhaler   SIG: inhale 2 puffs (180 mcg) by inhalation route every 4 hours as needed for 30 days   DISP: (1) Inhalation with 5 refills  Refilled on 12/09/2020     o Medications have been Reconciled  o Transition of Care or Provider Policy  · Instructions  o Depression Screen completed and scanned into the EMR under the designated folder within the patient's documents.  o Today's PHQ-9 result is _0__  o Patient was educated/instructed on their diagnosis, treatment and medications prior to discharge from the clinic today.  o Plan Of Care:   · Disposition  o Follow Up in 3 months            Electronically Signed by: Cesilia Anderson DO -Author on December 9, 2020 12:20:20 PM

## 2021-05-13 NOTE — PROGRESS NOTES
Progress Note      Patient Name: Rashid Mcknight   Patient ID: [id number]   Sex: Male   YOB: 1960    Primary Care Provider: Cesilia Anderson DO   Referring Provider: Cesilia Anderson DO    Visit Date: October 1, 2020    Provider: Zachary Wilks MD   Location: [facility]   Location Address: [address] [place] [address]  [id number]   Location Phone: [phone]          Chief Complaint  · Right knee pain       History Of Present Illness  Rashid Mcknight is a 59 year old /White male who presents today to [facility].      The patient presents here today for follow up evaluation of his right knee. He has had knee pain for several years with previous surgeries, mainly on his meniscus all prior to 2007. He has tried injections and aspirations in the past with minimal relief. He currently takes Percocet and tramadol to manage the pain through pain management. He has been complaining of swelling to the right knee.          Past Medical History  Allergic rhinitis; Arthritis; Asthma; Cricopharyngeal dysphagia; DDD (degenerative disc disease), cervical; DDD (degenerative disc disease), lumbar; Dysphagia; GERD (gastroesophageal reflux disease); Globus sensation; Head injury; Hypogonadism, testicular; Hypothyroidism; Migraine headache; Pain management; Reflux; Seasonal allergies; Seizure; Sjogrens syndrome; Stroke; Stroke (cerebrum); TB (tuberculosis) contact; Throat pain in adult; Thyroid disorder         Past Surgical History  Back; Back surgery; Cervical Fusion; Colonoscopy; EGD; MENISCUS TEAR; Metal implants; Rotator Cuff repair; Thyroidectomy         Medication List  Aspir-81 81 mg oral tablet,delayed release (DR/EC); Cialis 5 mg oral tablet; Depakote  mg oral tablet extended release 24 hr; diclofenac sodium 75 mg oral tablet,delayed release (DR/EC); Evoxac 30 mg oral capsule; fluticasone propionate 50 mcg/actuation nasal spray,suspension; Mirapex 0.5 mg oral tablet;  "multivitamin oral; omeprazole 40 mg oral capsule,delayed release(DR/EC); Percocet  mg oral tablet; ProAir HFA 90 mcg/actuation inhalation HFA aerosol inhaler; Skelaxin 800 mg oral tablet; Synthroid 137 mcg oral tablet; testosterone cypionate 200 mg/mL intramuscular oil         Allergy List  Latex Exam Gloves; NO KNOWN DRUG ALLERGIES         Family Medical History  Heart Disease; Cancer, Unspecified; Family history of breast cancer; Family history of MS (multiple sclerosis); Osteoporosis; Family history of Arthritis; Family history of heart disease; Family history of stomach cancer; Family history of malignant neoplasm of prostate         Social History  Alcohol (Former); Alcohol Use (Never); lives with spouse; .; Recreational Drug Use (Never); Tobacco (Never); Working         Review of Systems  · Constitutional  o Denies  o : fever, chills, weight loss  · Cardiovascular  o Denies  o : chest pain, shortness of breath  · Gastrointestinal  o Denies  o : liver disease, heartburn, nausea, blood in stools  · Genitourinary  o Denies  o : painful urination, blood in urine  · Integument  o Denies  o : rash, itching  · Neurologic  o Denies  o : headache, weakness, loss of consciousness  · Musculoskeletal  o Denies  o : painful, swollen joints  · Psychiatric  o Denies  o : drug/alcohol addiction, anxiety, depression      Vitals  Date Time BP Position Site L\R Cuff Size HR RR TEMP (F) WT  HT  BMI kg/m2 BSA m2 O2 Sat FR L/min FiO2        10/01/2020 08:44 AM      74 - R   231lbs 16oz 5'  11\" 32.36 2.3 98 %            Physical Examination  · Constitutional  o Appearance  o : well developed, well-nourished, no obvious deformities present  · Head and Face  o Head  o :   § Inspection  § : normocephalic  o Face  o :   § Inspection  § : no facial lesions  · Eyes  o Conjunctivae  o : conjunctivae normal  o Sclerae  o : sclerae white  · Ears, Nose, Mouth and Throat  o Ears  o :   § External Ears  § : appearance within " normal limits  § Hearing  § : intact  o Nose  o :   § External Nose  § : appearance normal  · Neck  o Inspection/Palpation  o : normal appearance  o Range of Motion  o : full range of motion  · Respiratory  o Respiratory Effort  o : breathing unlabored  o Inspection of Chest  o : normal appearance  o Auscultation of Lungs  o : no audible wheezing or rales  · Cardiovascular  o Heart  o : regular rate  · Gastrointestinal  o Abdominal Examination  o : soft and non-tender  · Skin and Subcutaneous Tissue  o General Inspection  o : intact, no rashes  · Psychiatric  o General  o : Alert and oriented x3  o Judgement and Insight  o : judgment and insight intact  o Mood and Affect  o : mood normal, affect appropriate  · Right Knee  o Inspection  o : Well-healed arthroscopy scars, pain with extension, flexion 120 degrees, stable to varus and valgus stress, stable to anterior and posterior drawer, Neurovascularly intact, sensation intact, tenderness over the medial joint line, tender over posterolateral knee, skin intact, no skin discoloration, mild pain with Justina's  · Imaging  o Imaging  o : [facility] 09/2020: 1. Complex tear of the posterior horn and body of the medial meniscus with extrusion. 2. Degeneration of the lateral meniscus with an oblique tear of the posterior horn extending to the body. 3. Mild to moderate tricompartmental osteoarthritis, most pronounced within the medial compartment. 4. Mucoid degeneration of the anterior cruciate ligament. 5. Small joint effusion with a small partially ruptured Baker''s cyst.           Assessment  · Right knee pain, unspecified chronicity     719.46/M25.561  · Medial meniscus tear     836.0/S83.249A  · Lateral meniscal tear     836.1/S83.289A  · Chondromalacia     733.92/M94.20      Plan  · Medications  o Medications have been Reconciled  o Transition of Care or Provider Policy  · Instructions  o MRI reviewed by Dr. Wilks.  o Reviewed the patient's Past Medical, Social, and  Family history as well as the ROS at today's visit, no changes.  o Call or return if worsening symptoms.  o Discussed surgery.  o Risks/benefits discussed with patient including, but not limited to: infection, bleeding, neurovascular damage, malunion, nonunion, aesthetic deformity, need for further surgery, and death.  o Discussed with patient the implant type being used during surgery and patient understands and desires to proceed.  o Surgery pamphlet given.  o The above service was scribed by Alexus Hdez on my behalf and I attest to the accuracy of the note. jsb  o Discussed treatment options with the patient. We recommended a right knee arthroscopy. Discussed the risks and benefits of a right knee arthroscopy. Patient expressed understanding, and will call back if he decides to schedule Right knee lateral/ medial partial meniscectomy and chondroplasty.   o Electronically Identified Patient Education Materials Provided Electronically            Electronically Signed by: Alexus Hdez MA -Author on October 1, 2020 08:55:52 AM  Electronically Co-signed by: Zachary Wilks MD -Reviewer on October 1, 2020 09:15:37 PM

## 2021-05-14 VITALS — OXYGEN SATURATION: 98 % | BODY MASS INDEX: 32.48 KG/M2 | HEIGHT: 71 IN | HEART RATE: 74 BPM | WEIGHT: 232 LBS

## 2021-05-14 VITALS — WEIGHT: 231.25 LBS | HEIGHT: 71 IN | BODY MASS INDEX: 32.37 KG/M2

## 2021-05-14 VITALS
DIASTOLIC BLOOD PRESSURE: 76 MMHG | RESPIRATION RATE: 20 BRPM | HEART RATE: 74 BPM | OXYGEN SATURATION: 97 % | SYSTOLIC BLOOD PRESSURE: 146 MMHG | TEMPERATURE: 98.5 F | WEIGHT: 241 LBS | BODY MASS INDEX: 33.74 KG/M2 | HEIGHT: 71 IN

## 2021-05-14 NOTE — PROGRESS NOTES
Progress Note      Patient Name: Rashid Mcknight   Patient ID: [id number]   Sex: Male   YOB: 1960    Primary Care Provider: Cesilia Anderson DO   Referring Provider: Cesilia Anderson DO    Visit Date: March 22, 2021    Provider: MARK Pearson   Location: [facility]   Location Address: [address] [place] [address]  [id number]   Location Phone: [phone]          Chief Complaint  · Right elbow and arm pain x1 3 months - 1 year.      History Of Present Illness  Rashid Mcknight is a 60 year old /White male who presents for evaluation and treatment of:      pt presents with R elbow       Past Medical History  Disease Name Date Onset Notes   Allergic rhinitis --  --    Arthritis --  --    Asthma --  --    Cricopharyngeal dysphagia --  --    DDD (degenerative disc disease), cervical 09/14/2018 --    DDD (degenerative disc disease), lumbar 09/14/2018 --    Dysphagia --  --    GERD (gastroesophageal reflux disease) 05/06/2019 --    Globus sensation --  --    Head injury --  --    Hypogonadism, testicular 09/14/2018 --    Hypothyroidism --  --    Migraine headache --  --    Pain management --  --    Reflux --  --    Seasonal allergies --  --    Seizure --  --    Sjogrens syndrome 09/14/2018 --    Stroke --  --    Stroke (cerebrum) --  --    TB (tuberculosis) contact --  --    Throat pain in adult --  --    Thyroid disorder --  --          Past Surgical History  Procedure Name Date Notes   Back --  --    Back surgery --  --    Cervical Fusion 2010 11 12 --    Colonoscopy 04/2019 --    EGD --  --    MENISCUS TEAR 5024-3185 --    Metal implants --  --    Rotator Cuff repair 4720-2164 --    Thyroidectomy 2018 --          Medication List  Name Date Started Instructions   Aspir-81 81 mg oral tablet,delayed release (DR/EC)  take 1 tablet (81 mg) by oral route once daily   Cialis 10 mg oral tablet 12/09/2020 take 1 tablet (10 mg) by oral route once daily for 30 days   Depakote   mg oral tablet extended release 24 hr 08/06/2020 take 1 tablet by oral route 2 times a day for 90 days   diclofenac sodium 75 mg oral tablet,delayed release (DR/EC) 11/30/2020 TAKE ONE TABLET BY MOUTH TWICE DAILY   Evoxac 30 mg oral capsule 12/09/2020 take 1 capsule (30 mg) by oral route 3 times per day for 90 days   fluticasone propionate 50 mcg/actuation nasal spray,suspension 01/27/2021 spray 1 - 2 sprays (50 - 100 mcg) in each nostril by intranasal route once daily as needed for 30 days   metaxalone 800 mg oral tablet 02/02/2021 TAKE ONE TABLET BY MOUTH THREE TIMES DAILY AS NEEDED   Mirapex 0.5 mg oral tablet 08/09/2020 take 1 tablet (0.5 mg) by oral route 3 for 90 days   multivitamin oral  take 1 by oral route 1   omeprazole 40 mg oral capsule,delayed release(DR/EC) 12/09/2020 TAKE ONE CAPSULE BY MOUTH TWICE DAILY for 30 days   Percocet  mg oral tablet  take 4 tablets by oral route daily   ProAir HFA 90 mcg/actuation inhalation HFA aerosol inhaler 12/09/2020 inhale 2 puffs (180 mcg) by inhalation route every 4 hours as needed for 30 days   Skelaxin 800 mg oral tablet 07/27/2020 take 1 tablet (800 mg) by oral route 3 times daily as needed   Synthroid 137 mcg oral tablet 07/28/2020 take 1 tablet by oral route 2 times a day for 90 days for 90 days   testosterone cypionate 200 mg/mL intramuscular oil 01/27/2021 INJECT 1 ML INTRAMUSCULARLY EVERY 2 WEEKS         Allergy List  Allergen Name Date Reaction Notes   Latex Exam Gloves --  --  --    NO KNOWN DRUG ALLERGIES --  --  --        Allergies Reconciled  Family Medical History  Disease Name Relative/Age Notes   Heart Disease Father/   Father   Cancer, Unspecified Father/   Father   Family history of breast cancer Grandmother (paternal)/   --    Family history of MS (multiple sclerosis) Mother/  Son/   --    Osteoporosis Mother/   Mother   Family history of Arthritis Father/   Father   Family history of heart disease Father/  Grandmother  "(maternal)/  Grandmother (paternal)/   --    Family history of stomach cancer Grandmother (paternal)/   --    Family history of malignant neoplasm of prostate Father/   --          Social History  Finding Status Start/Stop Quantity Notes   Alcohol Former --/20 --  07/22/2020 - Stopped drinking in 1979   Alcohol Use Never --/-- --  does not drink   lives with spouse --  --/-- --  --    . --  --/-- --  --    Recreational Drug Use Never --/-- --  no   Tobacco Never --/-- --  never smoker   Working --  --/-- --  --          Immunizations  NameDate Admin Mfg Trade Name Lot Number Route Inj VIS Given VIS Publication   InfluenzaRefused 09/20/2019 NE Not Entered  NE NE     Comments:    Tdap11/01/2019 SKB BOOSTRIX [id number] IM LD 11/01/2019    Comments: NDC# [id number]         Review of Systems  · Constitutional  o Denies  o : fever, fatigue, weight loss, weight gain  · HENT  o Denies  o : headaches, nasal congestion, sore throat  · Cardiovascular  o Denies  o : lower extremity edema, claudication, chest pressure, palpitations  · Respiratory  o Denies  o : shortness of breath, wheezing, cough, hemoptysis, dyspnea on exertion  · Gastrointestinal  o Denies  o : nausea, vomiting, diarrhea, constipation, abdominal pain  · Musculoskeletal  o Admits  o : elbow pain, wrist pain  · Psychiatric  o Denies  o : anxiety, depression, suicidal ideation, homicidal ideation      Vitals  Date Time BP Position Site L\R Cuff Size HR RR TEMP (F) WT  HT  BMI kg/m2 BSA m2 O2 Sat FR L/min FiO2        03/22/2021 10:23 /76 Sitting    74 - R 20 98.5 241lbs 0oz 5'  11\" 33.61 2.34 97 %  21%          Physical Examination  · Constitutional  o Appearance  o : no acute distress, well-nourished  · Head and Face  o Head  o :   § Inspection  § : atraumatic, normocephalic  o Face  o :   § Inspection  § : no facial lesions  · Eyes  o Eyes  o : extraocular movements intact, no scleral icterus, no conjunctival injection  · Ears, Nose, Mouth and " Throat  o Ears  o :   § External Ears  § : normal  o Nose  o :   § Intranasal Exam  § : nares patent  o Oral Cavity  o :   § Oral Mucosa  § : moist mucous membranes  · Respiratory  o Respiratory Effort  o : breathing comfortably, symmetric chest rise  o Auscultation of Lungs  o : clear to asculatation bilaterally, no wheezes, rales, or rhonchii  · Cardiovascular  o Heart  o :   § Auscultation of Heart  § : regular rate and rhythm, no murmurs, rubs, or gallops  o Peripheral Vascular System  o :   § Extremities  § : no edema  · Lymphatic  o Neck  o : no lymphadenopathy present  o Supraclavicular Nodes  o : no supraclavicular nodes  · Skin and Subcutaneous Tissue  o General Inspection  o : no lesions present, no areas of discoloration, skin turgor normal  · Neurologic  o Mental Status Examination  o :   § Orientation  § : grossly oriented to person, place and time  o Gait and Station  o :   § Gait Screening  § : normal gait  · Psychiatric  o General  o : normal mood and affect  · Right Elbow  o Inspection  o : no deformity, no scarring, no redness, no swelling  o Palpation  o : lateral epicondyle tender to palpation   o Range of Motion  o : normal flexion-150 degrees, normal extension-0 degrees, normal pronation-70 degrees, normal supination-90 degrees              Assessment  · Osteoarthritis     715.90/M19.90  · Elbow pain     719.42/M25.529  · Lateral epicondylitis (tennis elbow)     726.32/M77.10  · Sjogren's disease     710.2/M35.00       Tennis elbow:  Elbow strap wear as much as possible  Increase rest of extremity  Dc diclofenac   Start Meloxicam 15mg PO qd  Do not take with other nSAIDs  continue Percocet as prescribed by pain mgt  Ice 20 min QID prn  Discussed further tx options including MRI, steroid joint injection, PT or other        Problems Reconciled  Plan  · Orders  o ACO-14: Influenza immunization was not administered for reasons documented [facility] () - - 03/22/2021  o ACO-13: Fall Risk Screening  with no falls in past year or only one fall without injury in the past year (1101F) - - 03/22/2021   One fall, no injury  o ACO - Pt declines to or was not able to provide an Advance Care Plan or name a Surrogate Decision Maker (1124F) - - 03/22/2021  o ACO-39: Current medications updated and reviewed (1159F, ) - - 03/22/2021  o Elbow (Right) 3 views X-Ray [facility] ([id number]) - 719.42/M25.529, 726.32/M77.10 - 03/22/2021  · Medications  o meloxicam 15 mg oral tablet   SIG: take 1 tablet (15 mg) by oral route once daily for 30 days   DISP: (30) Tablet with 5 refills  Prescribed on 03/22/2021     o Elbow Strap miscellaneous misc   SIG: use as directed   DISP: (1) Box with 0 refills  Prescribed on 03/22/2021     o diclofenac sodium 75 mg oral tablet,delayed release (DR/EC)   SIG: TAKE ONE TABLET BY MOUTH TWICE DAILY   DISP: (60) Tablet with 4 refills  Discontinued on 03/22/2021     o Medications have been Reconciled  o Transition of Care or Provider Policy  · Instructions  o Patient is taking medications as prescribed and doing well.   o Take all medications as prescribed/directed.  o Patient was educated/instructed on their diagnosis, treatment and medications prior to discharge from the clinic today.  o Patient instructed to seek medical attention urgently for new or worsening symptoms.  o Call the office with any concerns or questions.  o Bring all medicines with their bottles to each office visit.  o Risks, benefits, and alternatives were discussed with the patient. The patient is aware of risks associated with: all meds and conditions.   o Chronic conditions reviewed and taken into consideration for today's treatment plan.  o Flu vaccine declined.  o Discussed Covid-19 precautions including, but not limited to, social distancing, avoid touching your face, and hand washing.   · Disposition  o Call or Return if symptoms worsen or persist.  o Follow Up PRN.  o Proceed to ED for all medical  emergencies.            Electronically Signed by: MARK Pearson -Author on March 22, 2021 11:19:03 AM

## 2021-05-15 VITALS — RESPIRATION RATE: 16 BRPM | WEIGHT: 262 LBS | HEIGHT: 71 IN | BODY MASS INDEX: 36.68 KG/M2

## 2021-05-15 VITALS
HEART RATE: 81 BPM | RESPIRATION RATE: 20 BRPM | WEIGHT: 256.25 LBS | HEIGHT: 71 IN | DIASTOLIC BLOOD PRESSURE: 81 MMHG | BODY MASS INDEX: 35.87 KG/M2 | SYSTOLIC BLOOD PRESSURE: 137 MMHG | TEMPERATURE: 98.1 F | OXYGEN SATURATION: 97 %

## 2021-05-15 VITALS
OXYGEN SATURATION: 96 % | BODY MASS INDEX: 33.18 KG/M2 | TEMPERATURE: 98.1 F | HEIGHT: 71 IN | HEART RATE: 88 BPM | DIASTOLIC BLOOD PRESSURE: 76 MMHG | SYSTOLIC BLOOD PRESSURE: 126 MMHG | WEIGHT: 237 LBS

## 2021-05-15 VITALS
RESPIRATION RATE: 16 BRPM | OXYGEN SATURATION: 96 % | WEIGHT: 253 LBS | DIASTOLIC BLOOD PRESSURE: 89 MMHG | HEIGHT: 71 IN | BODY MASS INDEX: 35.42 KG/M2 | TEMPERATURE: 98.1 F | HEART RATE: 80 BPM | SYSTOLIC BLOOD PRESSURE: 156 MMHG

## 2021-05-15 VITALS
WEIGHT: 246.37 LBS | HEIGHT: 71 IN | OXYGEN SATURATION: 98 % | HEART RATE: 80 BPM | TEMPERATURE: 98.1 F | BODY MASS INDEX: 34.49 KG/M2 | DIASTOLIC BLOOD PRESSURE: 71 MMHG | SYSTOLIC BLOOD PRESSURE: 124 MMHG | RESPIRATION RATE: 14 BRPM

## 2021-05-15 VITALS
HEART RATE: 84 BPM | HEIGHT: 71 IN | RESPIRATION RATE: 14 BRPM | DIASTOLIC BLOOD PRESSURE: 85 MMHG | OXYGEN SATURATION: 97 % | WEIGHT: 253.62 LBS | SYSTOLIC BLOOD PRESSURE: 166 MMHG | BODY MASS INDEX: 35.51 KG/M2 | TEMPERATURE: 96.1 F

## 2021-05-15 VITALS
HEIGHT: 71 IN | HEART RATE: 74 BPM | SYSTOLIC BLOOD PRESSURE: 148 MMHG | DIASTOLIC BLOOD PRESSURE: 83 MMHG | TEMPERATURE: 97.9 F | WEIGHT: 244 LBS | BODY MASS INDEX: 34.16 KG/M2

## 2021-05-15 VITALS — BODY MASS INDEX: 36.4 KG/M2 | HEIGHT: 71 IN | WEIGHT: 260 LBS | RESPIRATION RATE: 14 BRPM

## 2021-05-15 VITALS
OXYGEN SATURATION: 98 % | BODY MASS INDEX: 35.48 KG/M2 | SYSTOLIC BLOOD PRESSURE: 137 MMHG | HEART RATE: 92 BPM | WEIGHT: 253.44 LBS | TEMPERATURE: 97.9 F | RESPIRATION RATE: 18 BRPM | HEIGHT: 71 IN | DIASTOLIC BLOOD PRESSURE: 80 MMHG

## 2021-05-15 VITALS
TEMPERATURE: 96.7 F | BODY MASS INDEX: 35.6 KG/M2 | DIASTOLIC BLOOD PRESSURE: 77 MMHG | HEART RATE: 78 BPM | RESPIRATION RATE: 20 BRPM | HEIGHT: 71 IN | OXYGEN SATURATION: 99 % | SYSTOLIC BLOOD PRESSURE: 147 MMHG | WEIGHT: 254.25 LBS

## 2021-05-15 VITALS
OXYGEN SATURATION: 96 % | BODY MASS INDEX: 34.86 KG/M2 | SYSTOLIC BLOOD PRESSURE: 148 MMHG | HEIGHT: 71 IN | HEART RATE: 86 BPM | WEIGHT: 249 LBS | RESPIRATION RATE: 20 BRPM | DIASTOLIC BLOOD PRESSURE: 88 MMHG | TEMPERATURE: 98.7 F

## 2021-05-15 VITALS
SYSTOLIC BLOOD PRESSURE: 144 MMHG | OXYGEN SATURATION: 96 % | HEIGHT: 71 IN | BODY MASS INDEX: 34.6 KG/M2 | HEART RATE: 81 BPM | TEMPERATURE: 97.7 F | RESPIRATION RATE: 18 BRPM | WEIGHT: 247.19 LBS | DIASTOLIC BLOOD PRESSURE: 78 MMHG

## 2021-05-15 VITALS
HEIGHT: 71 IN | DIASTOLIC BLOOD PRESSURE: 92 MMHG | SYSTOLIC BLOOD PRESSURE: 153 MMHG | OXYGEN SATURATION: 98 % | RESPIRATION RATE: 18 BRPM | WEIGHT: 260.5 LBS | BODY MASS INDEX: 36.47 KG/M2 | TEMPERATURE: 97.4 F | HEART RATE: 74 BPM

## 2021-05-15 VITALS
BODY MASS INDEX: 36.68 KG/M2 | WEIGHT: 262 LBS | SYSTOLIC BLOOD PRESSURE: 132 MMHG | RESPIRATION RATE: 20 BRPM | TEMPERATURE: 98 F | HEIGHT: 71 IN | HEART RATE: 78 BPM | OXYGEN SATURATION: 95 % | DIASTOLIC BLOOD PRESSURE: 84 MMHG

## 2021-05-15 VITALS — HEIGHT: 71 IN | BODY MASS INDEX: 35.84 KG/M2 | HEART RATE: 79 BPM | WEIGHT: 256 LBS

## 2021-05-15 VITALS
BODY MASS INDEX: 35.61 KG/M2 | OXYGEN SATURATION: 96 % | DIASTOLIC BLOOD PRESSURE: 70 MMHG | WEIGHT: 254.37 LBS | HEIGHT: 71 IN | TEMPERATURE: 98 F | SYSTOLIC BLOOD PRESSURE: 160 MMHG | HEART RATE: 94 BPM | RESPIRATION RATE: 16 BRPM

## 2021-05-15 VITALS
WEIGHT: 253.12 LBS | SYSTOLIC BLOOD PRESSURE: 142 MMHG | HEART RATE: 70 BPM | DIASTOLIC BLOOD PRESSURE: 76 MMHG | OXYGEN SATURATION: 97 % | HEIGHT: 71 IN | TEMPERATURE: 97.7 F | RESPIRATION RATE: 20 BRPM | BODY MASS INDEX: 35.44 KG/M2

## 2021-05-16 VITALS
DIASTOLIC BLOOD PRESSURE: 76 MMHG | HEIGHT: 71 IN | WEIGHT: 225.06 LBS | RESPIRATION RATE: 14 BRPM | BODY MASS INDEX: 31.51 KG/M2 | OXYGEN SATURATION: 98 % | SYSTOLIC BLOOD PRESSURE: 125 MMHG | TEMPERATURE: 98 F | HEART RATE: 84 BPM

## 2021-05-16 VITALS
RESPIRATION RATE: 14 BRPM | HEIGHT: 71 IN | BODY MASS INDEX: 31.23 KG/M2 | SYSTOLIC BLOOD PRESSURE: 113 MMHG | DIASTOLIC BLOOD PRESSURE: 81 MMHG | WEIGHT: 223.06 LBS | TEMPERATURE: 98.3 F | HEART RATE: 74 BPM | OXYGEN SATURATION: 97 %

## 2021-06-18 RX ORDER — TADALAFIL 10 MG/1
TABLET ORAL
Qty: 30 TABLET | Refills: 5 | Status: SHIPPED | OUTPATIENT
Start: 2021-06-18

## 2021-07-21 RX ORDER — TESTOSTERONE CYPIONATE 200 MG/ML
INJECTION, SOLUTION INTRAMUSCULAR
Qty: 2 ML | Refills: 5 | Status: SHIPPED | OUTPATIENT
Start: 2021-07-21

## 2021-07-21 RX ORDER — LEVOTHYROXINE SODIUM 137 MCG
TABLET ORAL
Qty: 180 TABLET | Refills: 0 | Status: SHIPPED | OUTPATIENT
Start: 2021-07-21 | End: 2021-07-22

## 2021-07-22 ENCOUNTER — OFFICE VISIT (OUTPATIENT)
Dept: FAMILY MEDICINE CLINIC | Facility: CLINIC | Age: 61
End: 2021-07-22

## 2021-07-22 VITALS
BODY MASS INDEX: 32.2 KG/M2 | WEIGHT: 230 LBS | HEART RATE: 74 BPM | TEMPERATURE: 97.6 F | SYSTOLIC BLOOD PRESSURE: 157 MMHG | HEIGHT: 71 IN | DIASTOLIC BLOOD PRESSURE: 89 MMHG | OXYGEN SATURATION: 98 %

## 2021-07-22 DIAGNOSIS — E03.9 ACQUIRED HYPOTHYROIDISM: ICD-10-CM

## 2021-07-22 DIAGNOSIS — E66.09 CLASS 1 OBESITY DUE TO EXCESS CALORIES WITH SERIOUS COMORBIDITY AND BODY MASS INDEX (BMI) OF 32.0 TO 32.9 IN ADULT: ICD-10-CM

## 2021-07-22 DIAGNOSIS — R03.0 ELEVATED BLOOD-PRESSURE READING, WITHOUT DIAGNOSIS OF HYPERTENSION: Primary | ICD-10-CM

## 2021-07-22 PROBLEM — J30.2 SEASONAL ALLERGIC RHINITIS: Status: ACTIVE | Noted: 2021-07-22

## 2021-07-22 PROBLEM — J45.909 ASTHMA: Status: ACTIVE | Noted: 2021-07-22

## 2021-07-22 PROBLEM — M35.00 SJOGRENS SYNDROME (HCC): Status: ACTIVE | Noted: 2018-09-14

## 2021-07-22 PROBLEM — R56.9 SEIZURE (HCC): Status: ACTIVE | Noted: 2021-07-22

## 2021-07-22 PROBLEM — M19.90 ARTHRITIS: Status: ACTIVE | Noted: 2021-07-22

## 2021-07-22 PROBLEM — I71.20 ANEURYSM OF THORACIC AORTA: Status: ACTIVE | Noted: 2018-10-03

## 2021-07-22 PROBLEM — K21.9 GERD (GASTROESOPHAGEAL REFLUX DISEASE): Status: ACTIVE | Noted: 2019-05-06

## 2021-07-22 PROBLEM — G43.909 MIGRAINE HEADACHE: Status: ACTIVE | Noted: 2021-07-22

## 2021-07-22 PROCEDURE — 99214 OFFICE O/P EST MOD 30 MIN: CPT | Performed by: FAMILY MEDICINE

## 2021-07-22 RX ORDER — PREDNISONE 20 MG/1
TABLET ORAL
Qty: 24 TABLET | Refills: 1 | Status: SHIPPED | OUTPATIENT
Start: 2021-07-22 | End: 2021-08-03

## 2021-07-22 RX ORDER — OMEPRAZOLE 40 MG/1
40 CAPSULE, DELAYED RELEASE ORAL DAILY
COMMUNITY

## 2021-07-22 RX ORDER — FLUTICASONE PROPIONATE 50 MCG
2 SPRAY, SUSPENSION (ML) NASAL
Qty: 18.2 ML | Refills: 2 | Status: SHIPPED | OUTPATIENT
Start: 2021-07-22 | End: 2021-08-21

## 2021-07-22 RX ORDER — TRAMADOL HYDROCHLORIDE 50 MG/1
50 TABLET ORAL EVERY 6 HOURS PRN
COMMUNITY

## 2021-07-22 RX ORDER — ALBUTEROL SULFATE 90 UG/1
2 AEROSOL, METERED RESPIRATORY (INHALATION) EVERY 4 HOURS PRN
Qty: 18 G | Refills: 2 | Status: SHIPPED | OUTPATIENT
Start: 2021-07-22 | End: 2021-08-21

## 2021-07-22 RX ORDER — LEVOTHYROXINE SODIUM 137 MCG
274 TABLET ORAL DAILY
Qty: 180 TABLET | Refills: 1 | Status: SHIPPED | OUTPATIENT
Start: 2021-07-22

## 2021-07-22 RX ORDER — DIVALPROEX SODIUM 500 MG/1
TABLET, EXTENDED RELEASE ORAL
Qty: 180 TABLET | Refills: 3 | Status: SHIPPED | OUTPATIENT
Start: 2021-07-22

## 2021-07-22 NOTE — PROGRESS NOTES
"Chief Complaint  Discuss medications.    Subjective          Rashid Mcknight presents to [facility]  He is here today for management of his chronic medical conditions.  He has a past medical history is significant for Sjogren's syndrome, asthma, GERD, epilepsy, degenerative disc disease, hypothyroidism, cricopharyngeal dysphasia, obesity, hypogonadism and erectile dysfunction.      The patient has no other complaints today and denies chest pain, shortness of breath, weakness, numbness, nausea, vomiting, diarrhea, dizziness or syncopal event.        Objective   Vital Signs:   /89   Pulse 74   Temp 97.6 °F (36.4 °C) (Temporal)   Ht 180.3 cm (70.98\")   Wt 104 kg (230 lb)   SpO2 98%   BMI 32.09 kg/m²     Physical Exam  Vitals reviewed.   Constitutional:       Appearance: Normal appearance. He is well-developed. He is obese.   HENT:      Head: Normocephalic and atraumatic.      Right Ear: External ear normal.      Left Ear: External ear normal.      Mouth/Throat:      Pharynx: No oropharyngeal exudate.   Eyes:      Conjunctiva/sclera: Conjunctivae normal.      Pupils: Pupils are equal, round, and reactive to light.   Neck:      Vascular: No carotid bruit.   Cardiovascular:      Rate and Rhythm: Normal rate and regular rhythm.      Heart sounds: No murmur heard.   No friction rub. No gallop.    Pulmonary:      Effort: Pulmonary effort is normal.      Breath sounds: Normal breath sounds. No wheezing or rhonchi.   Abdominal:      General: Bowel sounds are normal. There is no distension.      Palpations: Abdomen is soft.      Tenderness: There is no abdominal tenderness.   Skin:     General: Skin is warm and dry.   Neurological:      Mental Status: He is alert and oriented to person, place, and time.      Cranial Nerves: No cranial nerve deficit.      Motor: No weakness.   Psychiatric:         Mood and Affect: Mood and affect normal.         Behavior: Behavior normal.         " Thought Content: Thought content normal.         Judgment: Judgment normal.        Result Review :     CMP    CMP 12/14/20   Glucose 90   BUN 13   Creatinine 1.01   Sodium 143   Potassium 4.9   Chloride 106   Calcium 8.8   Albumin 4.3   Total Bilirubin 0.28   Alkaline Phosphatase 59   AST (SGOT) 23   ALT (SGPT) 24           CBC    CBC 12/14/20   WBC 6.93   RBC 4.89   Hemoglobin 15.0   Hematocrit 46.9   MCV 95.9   MCH 30.7   MCHC 32.0 (A)   RDW 13.9   Platelets 236   (A) Abnormal value            Lipid Panel    Lipid Panel 12/14/20   Total Cholesterol 187   Triglycerides 108   HDL Cholesterol 51   VLDL Cholesterol 22   LDL Cholesterol  114 (A)   (A) Abnormal value       Comments are available for some flowsheets but are not being displayed.           TSH    TSH 12/14/20   TSH 0.507                     Assessment and Plan    Diagnoses and all orders for this visit:    1. Elevated blood-pressure reading, without diagnosis of hypertension (Primary)  Assessment & Plan:  The patient's admitting that he is in pain in his lumbar spine at today's visit.  He has been to the emergency room recently due to having lumbar pain.  He has a history of back surgeries and is back goes out from time to time.  When his back pain improves his blood pressure should go back to normal.      2. Class 1 obesity due to excess calories with serious comorbidity and body mass index (BMI) of 32.0 to 32.9 in adult  Assessment & Plan:  He is lost 11 pounds since his last visit 4 months ago.  Diet, weight loss and exercise were highly encouraged at today's visit.      3. Acquired hypothyroidism  Assessment & Plan:  The patient's most recent lab level was over a year ago but his thyroid level was within normal limits at that time.  The patient will be continued on 274 mcg daily.  The patient is moving next month and so he was not interested in getting labs at this time.  He was encouraged to get labs as soon as he gets a new provider at his next  location.      Other orders  -     Synthroid 137 MCG tablet; Take 2 tablets by mouth Daily.  Dispense: 180 tablet; Refill: 1  -     fluticasone (FLONASE) 50 MCG/ACT nasal spray; 2 sprays into the nostril(s) as directed by provider every night at bedtime for 30 days.  Dispense: 18.2 mL; Refill: 2  -     albuterol sulfate  (90 Base) MCG/ACT inhaler; Inhale 2 puffs Every 4 (Four) Hours As Needed for Wheezing for up to 30 days.  Dispense: 18 g; Refill: 2  -     predniSONE (DELTASONE) 20 MG tablet; Take 3 tablets by mouth Daily for 4 days, THEN 2 tablets Daily for 4 days, THEN 1 tablet Daily for 4 days.  Dispense: 24 tablet; Refill: 1      Follow Up   Return if symptoms worsen or fail to improve.  Patient was given instructions and counseling regarding his condition or for health maintenance advice. Please see specific information pulled into the AVS if appropriate.

## 2021-07-23 PROBLEM — R03.0 ELEVATED BLOOD-PRESSURE READING, WITHOUT DIAGNOSIS OF HYPERTENSION: Status: ACTIVE | Noted: 2021-07-23

## 2021-07-23 PROBLEM — E66.09 CLASS 1 OBESITY DUE TO EXCESS CALORIES WITH SERIOUS COMORBIDITY AND BODY MASS INDEX (BMI) OF 32.0 TO 32.9 IN ADULT: Status: ACTIVE | Noted: 2021-07-23

## 2021-07-23 PROBLEM — E03.9 ACQUIRED HYPOTHYROIDISM: Status: ACTIVE | Noted: 2021-07-23

## 2021-07-23 NOTE — ASSESSMENT & PLAN NOTE
The patient's most recent lab level was over a year ago but his thyroid level was within normal limits at that time.  The patient will be continued on 274 mcg daily.  The patient is moving next month and so he was not interested in getting labs at this time.  He was encouraged to get labs as soon as he gets a new provider at his next location.

## 2021-07-23 NOTE — ASSESSMENT & PLAN NOTE
The patient's admitting that he is in pain in his lumbar spine at today's visit.  He has been to the emergency room recently due to having lumbar pain.  He has a history of back surgeries and is back goes out from time to time.  When his back pain improves his blood pressure should go back to normal.

## 2021-07-23 NOTE — ASSESSMENT & PLAN NOTE
He is lost 11 pounds since his last visit 4 months ago.  Diet, weight loss and exercise were highly encouraged at today's visit.

## 2021-08-13 RX ORDER — PRAMIPEXOLE DIHYDROCHLORIDE 0.5 MG/1
0.5 TABLET ORAL 2 TIMES DAILY
Qty: 180 TABLET | Refills: 1 | Status: SHIPPED | OUTPATIENT
Start: 2021-08-13

## 2021-08-13 NOTE — TELEPHONE ENCOUNTER
.    Caller: Rashid Mcknight    Relationship: Self    Best call back number: [phone]    Medication needed:   Requested Prescriptions     Pending Prescriptions Disp Refills   • pramipexole (MIRAPEX) 0.5 MG tablet       Sig: Take 1 tablet by mouth.       When do you need the refill by: AS SOON AS POSSIBLE    What additional details did the patient provide when requesting the medication: PATIENT IS MOVING TO [place] AND PUT HIS MEDICATION ON A MOVIE TRUCK. THAT MOVING TRUCK IS NOW DELAYED AND PATIENT IS UNSURE WHEN HE WILL GET IT. PATIENT WOULD LIKE A 30 DAY SUPPLY TO HOLD HIM OVER UNTIL HE CAN GET HIS MEDICATION OFF THE TRUCK.    Does the patient have less than a 3 day supply:  [x] Yes  [] No    What is the patient's preferred pharmacy: [facility] - [place], [place] - [address] AT [facility] & [facility] [phone] [facility] [phone] FX

## 2021-12-09 ENCOUNTER — TELEPHONE (OUTPATIENT)
Dept: FAMILY MEDICINE CLINIC | Facility: CLINIC | Age: 61
End: 2021-12-09

## 2021-12-09 NOTE — TELEPHONE ENCOUNTER
Caller: Rashid Mcknight    Relationship to patient: Self    Best call back number: [phone]    Patient is needing: PATIENT CALLED STATING HE MOVED TO [place] BUT HE DOES NOT HAVE A PCP JUST YET. THE PATIENT STATED HIS BACK IS IN SO MUCH PAIN AND WOULD LIKE TO KNOW IF HIS PCP CAN CALL HIM IN SOME PREDNISONE TO HELP WITH THIS. THE PATIENT WOULD LIKE A CALL BACK IF THERE IS ANY QUESTIONS AND TO LET HIM KNOW THIS HAS BEEN SENT TO THE PHARMACY PLEASE ADVISE THANK YOU.         [facility]   [address]   [place], [place] [address]  PHARMACY #: [phone]

## 2021-12-10 RX ORDER — PREDNISONE 20 MG/1
TABLET ORAL
Qty: 24 TABLET | Refills: 0 | Status: SHIPPED | OUTPATIENT
Start: 2021-12-10 | End: 2021-12-22

## 2022-01-25 ENCOUNTER — TELEPHONE (OUTPATIENT)
Dept: FAMILY MEDICINE CLINIC | Facility: CLINIC | Age: 62
End: 2022-01-25

## 2022-01-25 NOTE — TELEPHONE ENCOUNTER
Caller: RILEY    Relationship: PHARMACY/ [facility] [place]    Best call back number: [phone]    Requested Prescriptions:   Requested Prescriptions     Pending Prescriptions Disp Refills   • cevimeline (EVOXAC) 30 MG capsule       Sig: Take 1 capsule by mouth.        Pharmacy where request should be sent: [facility] [place], [place] - [address] AT [facility] - [phone] [facility] [phone] FX     PATIENT IS IN [place] AND STILL DOESN'T HAVE A PROVIDER TO FILL THIS MEDICATION    Tish Melton, RegSched Rep   01/25/22 14:22 EST

## 2022-01-26 PROBLEM — M70.61 GREATER TROCHANTERIC BURSITIS OF RIGHT HIP: Status: ACTIVE | Noted: 2018-07-19

## 2022-01-26 PROBLEM — I63.9 CEREBROVASCULAR ACCIDENT (CVA) (HCC): Status: ACTIVE | Noted: 2022-01-26

## 2022-01-26 PROBLEM — I35.1 AORTIC REGURGITATION: Status: ACTIVE | Noted: 2018-10-03

## 2022-01-26 PROBLEM — E29.1 HYPOGONADISM, TESTICULAR: Status: ACTIVE | Noted: 2018-09-14

## 2022-01-26 RX ORDER — CEVIMELINE HYDROCHLORIDE 30 MG/1
30 CAPSULE ORAL 3 TIMES DAILY
Qty: 90 CAPSULE | Refills: 5 | Status: SHIPPED | OUTPATIENT
Start: 2022-01-26 | End: 2022-02-25

## 2022-01-26 NOTE — TELEPHONE ENCOUNTER
This meds is to treat his Sjogren's syndrome dry mouth. I sent it to the pharmacy. Please let him know.